# Patient Record
Sex: MALE | Race: WHITE | Employment: UNEMPLOYED | URBAN - METROPOLITAN AREA
[De-identification: names, ages, dates, MRNs, and addresses within clinical notes are randomized per-mention and may not be internally consistent; named-entity substitution may affect disease eponyms.]

---
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
## 2022-01-15 NOTE — PROGRESS NOTES
NAVIGN[facility] Clinician Contact & Progress Note  For Individual Resiliency Training (IRT)  A Part of the [facility] First Episode of Psychosis Program    NAVIGATE Enrollee: Bob Das (2000)     MRN: [id number]  Date:  12/24/21  Diagnosis: Psychosis, unspecified F29.0  Clinician: ADRI Individual Resiliency Trainer, MADELYN Scales     1. Type of contact: (majority of time spent)  IRT Session via telehealth  Mode of communication: Zoom (HIPAA compliant, secure platform). Patient consented verbally to this mode of therapy today.   Reason for telehealth: COVID-19. This patient visit was converted to a telehealth visit to minimize exposure to COVID-19.    2. People present:   Writer  Client: Yes    3. Length of Actual Contact: Start Time: 9:am; End Time: 10:02am     4. Location of contact:  Originating Location (patient location): [facility], located in [place], [place] (writer obtained [place] license #[id number])  Distant Location (provider location): Home office, located in [place], [place], using appropriate privacy considerations and procedures    5. Did the client complete the home practice option(s) from the previous session: partially completed    6. Motivational Teaching Strategies:  Connect info and skills with personal goals  Promote hope and positive expectations  Explore pros and cons of change  Re-frame experiences in positive light    7. Educational Teaching Strategies:  Review of written material/education  Relate information to client's experience  Ask questions to check comprehension  Break down information into small chunks  Adopt client's language     8. CBT Teaching Strategies:  Reinforcement and shaping (positive feedback for steps towards goals and gains in knowledge & skills)  Relapse prevention planning (review of stressors and early warning signs)  Coping skills training (review current coping skills and increase currently used skills)  Behavioral tailoring (fit taking medication into client's daily  routine)    9. IRT Module(s) Addressed:  Coping with symptoms  Building a bridge to goals    10. Techniques utilized:   [place] announced at beginning of session  Review of goal  Review of previous meeting  Present new material  Problem-solving practice  Help client choose a home practice option  Summarize progress made in current session    11. Measures:    Mental Status Exam  Alertness: alert  and oriented  Behavior/Demeanor: cooperative and pleasant  Speech: regular rate and rhythm  Language: intact and no obvious problem.   Mood: depressed and description consistent with euthymia   Thought Process/Associations: unremarkable  Thought Content:  Reports preoccupations;  Denies suicidal ideation, violent ideation and delusions  Perception:  Reports auditory hallucinations without commands [details in Interim History], visual hallucinations and tactile hallucinations (no significant changes);  Denies depersonalization and derealization  Insight: fair  Judgment: fair  Cognition: does  appear grossly intact; formal cognitive testing was not done    12. Assessment/Progress Note:     Writer met with Bob via telehealth on this date for IRT. Writer set agenda to check-in, discuss and assess symptoms, explore material in IRT modules, discuss ways in which Bob can expand coping strategies and stress-management techniques for ongoing symptom management, and check-in regarding goals for ongoing recovery. During check-in, assessed mood and symptoms. Bob reports continued depressed mood and reports no changes to symptoms of psychosis. Denies any safety concerns today. Spent time in session reviewing the symptoms of depression and engaged Bob in reflection of if/how depression may be impacting his life day-to-day. Writer offered support and provided psychoeducation on components of behavioral activation as a way to cope with and potentially improve depression. Bob identifies a goal of making friends and increasing community.  "When writer engages Bob in exploration of potential ways to do this, he consistently offers a response to the effect of, \"what good would come of it?\" and it seems feels any efforts will not be helpful or get him anywhere closer to what he is looking for. Writer challenged Bob to shift response from what good will come of it to \"why not? What harm would it do trying something new?\" Bob was partially open and receptive to this it seemed. Spent time in session then discussing dynamics present in the therapeutic relationship including feeling that he is just a number, wondering if writer actually cares and feeling \"pathetic\" divulging such personal information to someone who meets with so many other people. Writer validated Bob's perspective. Expressed understanding why he feels this way; writer communicated care for Bob and invited him to continue to share as he feels comfortable. Will continue to attend to this dynamic and these concerns. Bob then shared a goal of moving abroad and reflected on the concept that the comfort zone is where dreams go to die. Writer provided validation, support and re-framing where appropriate. Utilized primarily an insight-oriented, strengths-based approach to promote and enhance self-awareness, understanding and acceptance.     Overall, Bob was open and engaged throughout the session. Symptom assessment, safety assessment, discussion and identification of coping strategies, and exploration of material in IRT modules was all in support of Bob's self-identified goal(s) as identified in most recent BEH Treatment Plan. Progress toward goal completion seems fair.    13. Plan/Referrals:     Next session scheduled 1/6 at 9am. Client aware they can reach out to writer directly and/or [facility] team should concerns or needs arise prior to next scheduled appointment.     Billing for \"Interactive Complexity\"?    No      MADELYN Scales    NAVIGATE Individual Resiliency Trainer  "

## 2022-01-21 ENCOUNTER — VIRTUAL VISIT (OUTPATIENT)
Dept: PSYCHIATRY | Facility: CLINIC | Age: 22
End: 2022-01-21
Payer: MEDICAID

## 2022-01-21 ENCOUNTER — BEH TREATMENT PLAN (OUTPATIENT)
Dept: PSYCHIATRY | Facility: CLINIC | Age: 22
End: 2022-01-21

## 2022-01-21 DIAGNOSIS — F29 PSYCHOSIS, UNSPECIFIED PSYCHOSIS TYPE (H): Primary | ICD-10-CM

## 2022-01-21 NOTE — PROGRESS NOTES
NAVIGN[facility] Clinician Contact & Progress Note  For Individual Resiliency Training (IRT)  A Part of the [facility] First Episode of Psychosis Program    NAVIGATE Enrollee: Bob Das (2000)     MRN: [id number]  Date:  1/21/22  Diagnosis: Psychosis, unspecified F29.0  Clinician: [facility] Individual Resiliency Trainer, MADELYN Scales     1. Type of contact: (majority of time spent)  IRT Session via telehealth  Mode of communication: Zoom (HIPAA compliant, secure platform). Patient consented verbally to this mode of therapy today.   Reason for telehealth: COVID-19. This patient visit was converted to a telehealth visit to minimize exposure to COVID-19.    2. People present:   Writer  Client: Yes    3. Length of Actual Contact: Start Time: 9:10am; End Time: 10:11am     4. Location of contact:  Originating Location (patient location): [place], located in [place], [place] (writer obtained [place] license #[id number])  Distant Location (provider location): Home office, located in [place], [place], using appropriate privacy considerations and procedures    5. Did the client complete the home practice option(s) from the previous session: partially completed    6. Motivational Teaching Strategies:  Connect info and skills with personal goals  Promote hope and positive expectations  Explore pros and cons of change  Re-frame experiences in positive light    7. Educational Teaching Strategies:  Review of written material/education  Relate information to client's experience  Ask questions to check comprehension  Break down information into small chunks  Adopt client's language     8. CBT Teaching Strategies:  Reinforcement and shaping (positive feedback for steps towards goals and gains in knowledge & skills)  Relapse prevention planning (review of stressors and early warning signs)  Coping skills training (review current coping skills and increase currently used skills)  Behavioral tailoring (fit taking medication into client's daily  routine)    9. IRT Module(s) Addressed:  Coping with symptoms  Building a bridge to goals    10. Techniques utilized:   [place] announced at beginning of session  Review of goal  Review of previous meeting  Present new material  Problem-solving practice  Help client choose a home practice option  Summarize progress made in current session    11. Measures:    Mental Status Exam  Alertness: alert  and oriented  Behavior/Demeanor: cooperative and pleasant  Speech: regular rate and rhythm  Language: intact and no obvious problem.   Mood: depressed and description consistent with euthymia   Thought Process/Associations: unremarkable  Thought Content:  Reports preoccupations;  Denies suicidal ideation, violent ideation and delusions  Perception:  Reports auditory hallucinations without commands [details in Interim History], visual hallucinations and tactile hallucinations (no significant changes);  Denies depersonalization and derealization  Insight: fair  Judgment: fair  Cognition: does  appear grossly intact; formal cognitive testing was not done    KATHY-7  Over the last 2 weeks, how often have you been bothered by the following problems?    1. Feeling nervous, anxious or on edge: 2 - More than half the days  2. Not being able to stop or control worryin - Several days  3. Worrying too much about different things: 1 - Several days  4. Trouble relaxin - Several days  5. Being so restless that it is hard to sit still: 1 - Several days  6. Becoming easily annoyed or irritable: 1 - Several days  7. Feeling afraid as if something awful might happen: 1 - Several days    PHQ-9  Over the last 2 weeks, how often have you been bothered by any the following problems?    1. Little interest or pleasure in doing things: 1 - Several days  2. Feeling down, depressed, or hopeless: 1 - Several days; 1.5  3. Trouble falling or staying asleep, or sleeping too much: 2 - More than half the days; varying sleep schedule, sometimes  "fragmented sleep, hard to evaluate  4. Feeling tired or having little energy: 2 - More than half the days  5. Poor appetite or overeatin - Not at all  6. Feeling bad about yourself - or that you are a failure or have let yourself or your family down: 1 - Several days  7. Trouble concentrating on things, such as reading the newspaper or watching television: 0 - Not at all  8. Moving or speaking so slowly that other people could have noticed. Or the opposite-being fidgety or restless that you have been moving around a lot more than usual: 1 - Several days  9. Thoughts that you would be better off dead, or of hurting yourself in some way: 1 - Several days; \"one at most\"    If you checked off any problems, how difficulty have these problems made it for you to do your work, take care of things at home, or get along with other people? Somewhat difficult    [place] Protocol Risk Identification  1) Have you wished you were dead or wished you could go to sleep and not wake up? Yes; \"at one point briefly\"  2) Have you actually had any thoughts about killing yourself? No  If YES to 2, answer questions 3, 4, 5, 6  If NO to 2, go directly to question 6  3) Have you thought about how you might do this? No  4) Have you had any intension of acting on these thoughts of killing yourself, as opposed to you have the thoughts but you definitely would not act on them? No  5) Have you started to work out or worked out the details of how to kill yourself? Do you intend to carry out this plan? No  Always Ask Question 6  6) Have you done anything, started to do anything, or prepared to do anything to end your life? No  Examples: collected pills, obtained a gun, gave away valuables, wrote a will or suicide note, held a gun but changed your mind, cut yourself, tried to hang yourself, etc.      12. Assessment/Progress Note:     Writer met with Bob via telehealth on this date for IRT. Writer set agenda to check-in, discuss and assess " "symptoms, explore material in IRT modules, discuss ways in which Bob can expand coping strategies and stress-management techniques for ongoing symptom management, and check-in regarding goals for ongoing recovery. During check-in, assessed mood and symptoms. Bob reports overall he has been doing well. Reports no changes to psychosis and describes experience of symptoms to be manageable. Reports one time a \"fleeting thought\" related to suicide but other than that denies SI/SH and denies HI/VI. Spent time in session reflecting back on this past year and engaging Bob in exploration of what he feels went well and what he may hope could be different for this next year. Bob was very active and engaged in this exercise. 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 Will discuss details of goals next week due to time constraints. Throughout the session writer provided validation, support and re-framing where appropriate. Utilized primarily an insight-oriented, strengths-based approach to promote and enhance self-awareness, understanding and acceptance. Overall, Bob was open and engaged throughout the session. Symptom assessment, safety assessment, discussion and identification of coping strategies, and exploration of material in IRT modules was all in support of Bob's self-identified goal(s) as identified in most recent BEH Treatment Plan. Progress toward goal completion seems fair.    13. Plan/Referrals:     Next session scheduled in one week. Client aware they can reach out to writer directly and/or [facility]IGATE team should concerns or needs arise prior to next scheduled appointment.     Billing for \"Interactive Complexity\"?    No      Nancy Rubin [facility]    NAVIGATE Individual Resiliency Trainer  "

## 2022-02-04 ENCOUNTER — VIRTUAL VISIT (OUTPATIENT)
Dept: PSYCHIATRY | Facility: CLINIC | Age: 22
End: 2022-02-04
Payer: MEDICAID

## 2022-02-04 DIAGNOSIS — F29 PSYCHOSIS, UNSPECIFIED PSYCHOSIS TYPE (H): Primary | ICD-10-CM

## 2022-02-04 NOTE — PROGRESS NOTES
NA[facility] Clinician Contact & Progress Note  For Individual Resiliency Training (IRT)  A Part of the [facility] First Episode of Psychosis Program    NAVIGATE Enrollee: Bob Das (2000)     MRN: [id number]  Date:  2/04/22  Diagnosis: Psychosis, unspecified F29.0  Clinician: [facility] Individual Resiliency Trainer, MADELYN Scales     1. Type of contact: (majority of time spent)  IRT Session via telehealth  Mode of communication: [email or web address] (HIPAA compliant, secure platform). Patient consented verbally to this mode of therapy today.   Reason for telehealth: COVID-19. This patient visit was converted to a telehealth visit to minimize exposure to COVID-19.    2. People present:   Writer  Client: Yes    3. Length of Actual Contact: Start Time: 9:am; End Time: 9:57am     4. Location of contact:  Originating Location (patient location): [place], located in [place], [place] (writer obtained [place] license #[id number])  Distant Location (provider location): Home office, located in [place], [place], using appropriate privacy considerations and procedures    5. Did the client complete the home practice option(s) from the previous session: partially completed    6. Motivational Teaching Strategies:  Connect info and skills with personal goals  Promote hope and positive expectations  Explore pros and cons of change  Re-frame experiences in positive light    7. Educational Teaching Strategies:  Review of written material/education  Relate information to client's experience  Ask questions to check comprehension  Break down information into small chunks  Adopt client's language     8. CBT Teaching Strategies:  Reinforcement and shaping (positive feedback for steps towards goals and gains in knowledge & skills)  Relapse prevention planning (review of stressors and early warning signs)  Coping skills training (review current coping skills and increase currently used skills)  Behavioral tailoring (fit taking medication into client's daily  "routine)    9. IRT Module(s) Addressed:  Building a bridge to goals    10. Techniques utilized:   [place] announced at beginning of session  Review of goal  Review of previous meeting  Present new material  Problem-solving practice  Help client choose a home practice option  Summarize progress made in current session    11. Measures:    Mental Status Exam  Alertness: alert  and oriented  Behavior/Demeanor: cooperative and pleasant  Speech: regular rate and rhythm  Language: intact and no obvious problem.   Mood: depressed and description consistent with euthymia   Thought Process/Associations: unremarkable  Thought Content:  Reports preoccupations;  Denies suicidal ideation, violent ideation and delusions  Perception:  Reports auditory hallucinations without commands [details in Interim History], visual hallucinations and tactile hallucinations (no significant changes);  Denies depersonalization and derealization  Insight: fair  Judgment: fair  Cognition: does  appear grossly intact; formal cognitive testing was not done    12. Assessment/Progress Note:     Writer met with Bob via telehealth on this date for IRT. Writer set agenda to check-in, discuss and assess symptoms, explore material in IRT modules, discuss ways in which Bob can expand coping strategies and stress-management techniques for ongoing symptom management, and check-in regarding goals for ongoing recovery. During check-in, assessed mood and symptoms. Bob reports his mood as slightly improved recently; denies SI/SH, denies HI/VI and reports no changes to psychosis. 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 Spent time in session continuing to explore Bob's expressed goal of starting a web comic related to mental health or psychosis. Bob shared more about this goal, including his own experiences appreciating web comics by other individuals, and he discussed aspects of how this could " "look and what a storyline could entail. Bob shared different considerations about the best way to share this work and writer provided validation and support throughout. Utilized primarily an insight-oriented, strengths-based approach to promote and enhance self-awareness, understanding and acceptance. Set goal for next week to finish one drawing. Will continue to discuss and plan in upcoming sessions. Overall, Bob was open and engaged throughout the session. Symptom assessment, safety assessment, discussion and identification of coping strategies, and exploration of material in IRT modules was all in support of Bob's self-identified goal(s) as identified in most recent BEH Treatment Plan. Progress toward goal completion seems fair.    13. Plan/Referrals:     Next session scheduled in one week. Client aware they can reach out to writer directly and/or [facility]IG[facility] team should concerns or needs arise prior to next scheduled appointment.     Billing for \"Interactive Complexity\"?    No      MADELYN Scales    NAVIGATE Individual Resiliency Trainer  "

## 2022-02-18 ENCOUNTER — VIRTUAL VISIT (OUTPATIENT)
Dept: PSYCHIATRY | Facility: CLINIC | Age: 22
End: 2022-02-18
Payer: MEDICAID

## 2022-02-18 DIAGNOSIS — F29 PSYCHOSIS, UNSPECIFIED PSYCHOSIS TYPE (H): Primary | ICD-10-CM

## 2022-02-18 NOTE — PROGRESS NOTES
[facility] Clinician Contact & Progress Note  For Individual Resiliency Training (IRT)  A Part of the [facility] First Episode of Psychosis Program    NAVIGATE Enrollee: Bob Das (2000)     MRN: [id number]  Date:  2/18/22  Diagnosis: Psychosis, unspecified F29.0  Clinician: [facility] Individual Resiliency Trainer, MADELYN Scales     1. Type of contact: (majority of time spent)  IRT Session via telehealth  Mode of communication: [email or web address] (HIPAA compliant, secure platform). Patient consented verbally to this mode of therapy today.   Reason for telehealth: COVID-19. This patient visit was converted to a telehealth visit to minimize exposure to COVID-19.    2. People present:   Writer  Client: Yes    3. Length of Actual Contact: Start Time: 9:00am; End Time: 10:01am     4. Location of contact:  Originating Location (patient location): [facility], located in [place], [place] (writer obtained [place] license #[id number])  Distant Location (provider location): Home office, located in [place], [place], using appropriate privacy considerations and procedures    5. Did the client complete the home practice option(s) from the previous session: partially completed    6. Motivational Teaching Strategies:  Connect info and skills with personal goals  Promote hope and positive expectations  Explore pros and cons of change  Re-frame experiences in positive light    7. Educational Teaching Strategies:  Review of written material/education  Relate information to client's experience  Ask questions to check comprehension  Break down information into small chunks  Adopt client's language     8. CBT Teaching Strategies:  Reinforcement and shaping (positive feedback for steps towards goals and gains in knowledge & skills)  Relapse prevention planning (review of stressors and early warning signs)  Coping skills training (review current coping skills and increase currently used skills)  Behavioral tailoring (fit taking medication into client's daily  "routine)    9. IRT Module(s) Addressed:  Building a bridge to goals  Coping with Symptoms    10. Techniques utilized:   [place] announced at beginning of session  Review of goal  Review of previous meeting  Present new material  Problem-solving practice  Help client choose a home practice option  Summarize progress made in current session    11. Measures:    Mental Status Exam  Alertness: alert  and oriented  Behavior/Demeanor: cooperative and pleasant  Speech: regular rate and rhythm  Language: intact and no obvious problem.   Mood: depressed and description consistent with euthymia   Thought Process/Associations: unremarkable  Thought Content:  Reports preoccupations and obsessions ;  Denies suicidal ideation, violent ideation and delusions  Perception:  Reports auditory hallucinations without commands [details in Interim History], visual hallucinations and tactile hallucinations (no significant changes);  Denies depersonalization and derealization  Insight: fair  Judgment: fair  Cognition: does  appear grossly intact; formal cognitive testing was not done    12. Assessment/Progress Note:     Writer met with Bob via telehealth on this date for IRT. Writer set agenda to check-in, discuss and assess symptoms, explore material in IRT modules, discuss ways in which Bob can expand coping strategies and stress-management techniques for ongoing symptom management, and check-in regarding goals for ongoing recovery. During check-in, assessed mood and symptoms. Bob reports his mood has overall been good. Denies SI/SH and denies HI/VI. 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 Bob described this week \"not having it in me\" to be in contact with one of his good friends. Through exploration identified it was not necessarily the act of connecting that felt burdensome, but rather the way in " "which Bob feels he needs/wants to communicate and the amount of time, intention, thought and care he puts into every message. Writer suggested this isn't in and of itself negative in any way, but questioned Bob if there are ways that his perfectionism/particularity with his messages may interfere with his life or may be unhelpful in relationships and building the life he desires. Have queried Bob about this quality as it relates to his art and work as well. Bob was open and receptive to exploring this together. He agreed there may be some benefit in increasing his tolerance to continue interacting/engaging without need for perfection. Will plan to further explore what comes up for Bob when he considers engaging in a different way and not requiring so much of himself in every interaction. Will then utilize CBT and ACT framework to increase adaptive thoughts and increase psychological flexibility. Overall, Bob was open and engaged throughout the session. Symptom assessment, safety assessment, discussion and identification of coping strategies, and exploration of material in IRT modules was all in support of Bob's self-identified goal(s) as identified in most recent BEH Treatment Plan. Progress toward goal completion seems fair.    13. Plan/Referrals:     Next session scheduled in one week. Reminded Bob to complete Community Care Application that writer sent to [facility] a couple of weeks back.     Client aware they can reach out to writer directly and/or NAVIGATE team should concerns or needs arise prior to next scheduled appointment.     Billing for \"Interactive Complexity\"?    No      MADELYN Scales    NAVIGATE Individual Resiliency Trainer  "

## 2022-03-04 ENCOUNTER — VIRTUAL VISIT (OUTPATIENT)
Dept: PSYCHIATRY | Facility: CLINIC | Age: 22
End: 2022-03-04
Payer: MEDICAID

## 2022-03-04 DIAGNOSIS — F29 PSYCHOSIS, UNSPECIFIED PSYCHOSIS TYPE (H): Primary | ICD-10-CM

## 2022-03-11 ENCOUNTER — VIRTUAL VISIT (OUTPATIENT)
Dept: PSYCHIATRY | Facility: CLINIC | Age: 22
End: 2022-03-11
Payer: MEDICAID

## 2022-03-11 DIAGNOSIS — F29 PSYCHOSIS, UNSPECIFIED PSYCHOSIS TYPE (H): Primary | ICD-10-CM

## 2022-03-11 NOTE — PROGRESS NOTES
NAVIGNICO Clinician Contact & Progress Note  For Individual Resiliency Training (IRT)  A Part of the [facility] First Episode of Psychosis Program    NAVIGATE Enrollee: Bob Das (2000)     MRN: [id number]  Date:  3/11/22  Diagnosis: Psychosis, unspecified F29.0  Clinician: ADRI Individual Resiliency Trainer, MADELYN Scales     1. Type of contact: (majority of time spent)  IRT Session via telehealth  Mode of communication: [email or web address] (HIPAA compliant, secure platform). Patient consented verbally to this mode of therapy today.   Reason for telehealth: COVID-19. This patient visit was converted to a telehealth visit to minimize exposure to COVID-19.    2. People present:   Writer  Client: Yes    3. Length of Actual Contact: Start Time: 9:00am; End Time: 9:40am     4. Location of contact:  Originating Location (patient location): [facility], located in [place], [place] (writer obtained [place] license #[id number])  Distant Location (provider location): Home office, located in [place], [place], using appropriate privacy considerations and procedures    5. Did the client complete the home practice option(s) from the previous session: Completed    6. Motivational Teaching Strategies:  Connect info and skills with personal goals  Promote hope and positive expectations  Explore pros and cons of change  Re-frame experiences in positive light    7. Educational Teaching Strategies:  Review of written material/education  Relate information to client's experience  Ask questions to check comprehension  Break down information into small chunks  Adopt client's language     8. CBT Teaching Strategies:  Reinforcement and shaping (positive feedback for steps towards goals and gains in knowledge & skills)  Relapse prevention planning (review of stressors and early warning signs)  Coping skills training (review current coping skills and increase currently used skills)  Behavioral tailoring (fit taking medication into client's daily  routine)    9. IRT Module(s) Addressed:  Building a bridge to goals  Coping with Symptoms    10. Techniques utilized:   [place] announced at beginning of session  Review of goal  Review of previous meeting  Present new material  Problem-solving practice  Help client choose a home practice option  Summarize progress made in current session    11. Measures:    Mental Status Exam  Alertness: alert  and oriented  Behavior/Demeanor: cooperative and pleasant  Speech: regular rate and rhythm  Language: intact and no obvious problem.   Mood: depressed and description consistent with euthymia   Thought Process/Associations: unremarkable  Thought Content:  Reports preoccupations and obsessions ;  Denies suicidal ideation, violent ideation and delusions  Perception:  Reports auditory hallucinations without commands [details in Interim History], visual hallucinations and tactile hallucinations (no significant changes);  Denies depersonalization and derealization  Insight: fair  Judgment: fair  Cognition: does  appear grossly intact; formal cognitive testing was not done    KATHY-7  Over the last 2 weeks, how often have you been bothered by the following problems?    1. Feeling nervous, anxious or on edge: 1 - Several days  2. Not being able to stop or control worryin - Not at all  3. Worrying too much about different things: 1 - Several days  4. Trouble relaxin - Not at all  5. Being so restless that it is hard to sit still: 0 - Not at all  6. Becoming easily annoyed or irritable: 0 - Not at all  7. Feeling afraid as if something awful might happen: 0 - Not at all    PHQ-9  Over the last 2 weeks, how often have you been bothered by any the following problems?    1. Little interest or pleasure in doing things: 0 - Not at all  2. Feeling down, depressed, or hopeless: 0 - Not at all  3. Trouble falling or staying asleep, or sleeping too much: 2 - More than half the days; some days where I oversleep  4. Feeling tired or having  little energy: 1 - Several days  5. Poor appetite or overeatin - Not at all  6. Feeling bad about yourself - or that you are a failure or have let yourself or your family down: 1 - Several days  7. Trouble concentrating on things, such as reading the newspaper or watching television: 0 - Not at all  8. Moving or speaking so slowly that other people could have noticed. Or the opposite-being fidgety or restless that you have been moving around a lot more than usual: 0 - Not at all  9. Thoughts that you would be better off dead, or of hurting yourself in some way: 0 - Not at all    If you checked off any problems, how difficulty have these problems made it for you to do your work, take care of things at home, or get along with other people? Not difficult at all    [place] Protocol Risk Identification  1) Have you wished you were dead or wished you could go to sleep and not wake up? No  2) Have you actually had any thoughts about killing yourself? No  If YES to 2, answer questions 3, 4, 5, 6  If NO to 2, go directly to question 6  3) Have you thought about how you might do this? N/A  4) Have you had any intension of acting on these thoughts of killing yourself, as opposed to you have the thoughts but you definitely would not act on them? N/A  5) Have you started to work out or worked out the details of how to kill yourself? Do you intend to carry out this plan? N/A  Always Ask Question 6  6) Have you done anything, started to do anything, or prepared to do anything to end your life? No  Examples: collected pills, obtained a gun, gave away valuables, wrote a will or suicide note, held a gun but changed your mind, cut yourself, tried to hang yourself, etc.      12. Assessment/Progress Note:     Writer met with Bob via telehealth on this date for IRT. Writer set agenda to check-in, discuss and assess symptoms, explore material in IRT modules, discuss ways in which Bob can expand coping strategies and  "stress-management techniques for ongoing symptom management, and check-in regarding goals for ongoing recovery. During check-in, completed formal assessment measures as documented above. Bob reports overall improvement in both mood and anxiety. Describes the past week as positive and shared different ways Bob has been enjoying his time. Spent time in session offering space for Bob to process current events including war in [place]. Reflected together on what is in our control versus what isn't and how to do what we can without getting overwhelmed. Also discussed plans for his birthday coming up. .Writer provided validation, support and re-framing where appropriate. Utilized primarily an insight-oriented, strengths-based approach to promote and enhance self-awareness, understanding and acceptance. Overall, Bob was open and engaged throughout the session. Symptom assessment, safety assessment, discussion and identification of coping strategies, and exploration of material in IRT modules was all in support of Bob's self-identified goal(s) as identified in most recent BEH Treatment Plan. Progress toward goal completion seems fair.    13. Plan/Referrals:     Next session scheduled in one week.    Client aware they can reach out to writer directly and/or NAVIGATE team should concerns or needs arise prior to next scheduled appointment.     Billing for \"Interactive Complexity\"?    No      Nancy Rubin [facility]    NAVIGATE Individual Resiliency Trainer  "

## 2022-03-25 ENCOUNTER — VIRTUAL VISIT (OUTPATIENT)
Dept: PSYCHIATRY | Facility: CLINIC | Age: 22
End: 2022-03-25
Payer: MEDICAID

## 2022-03-25 DIAGNOSIS — F29 PSYCHOSIS, UNSPECIFIED PSYCHOSIS TYPE (H): Primary | ICD-10-CM

## 2022-03-25 NOTE — PROGRESS NOTES
NAVIGNICO Clinician Contact & Progress Note  For Individual Resiliency Training (IRT)  A Part of the [facility] First Episode of Psychosis Program    NAVIGATE Enrollee: Bob Das (2000)     MRN: [id number]  Date:  3/25/22  Diagnosis: Psychosis, unspecified F29.0  Clinician: ADRI Individual Resiliency Trainer, MADELYN Scales     1. Type of contact: (majority of time spent)  IRT Session via telehealth  Mode of communication: [email or web address] (HIPAA compliant, secure platform). Patient consented verbally to this mode of therapy today.   Reason for telehealth: COVID-19. This patient visit was converted to a telehealth visit to minimize exposure to COVID-19.    2. People present:   Writer  Client: Yes    3. Length of Actual Contact: Start Time: 9:00am; End Time: 9:58am     4. Location of contact:  Originating Location (patient location): [place], located in [place], [place] (writer obtained [place] license #[id number])  Distant Location (provider location): Home office, located in [place], [place], using appropriate privacy considerations and procedures    5. Did the client complete the home practice option(s) from the previous session: Completed    6. Motivational Teaching Strategies:  Connect info and skills with personal goals  Promote hope and positive expectations  Explore pros and cons of change  Re-frame experiences in positive light    7. Educational Teaching Strategies:  Review of written material/education  Relate information to client's experience  Ask questions to check comprehension  Break down information into small chunks  Adopt client's language     8. CBT Teaching Strategies:  Reinforcement and shaping (positive feedback for steps towards goals and gains in knowledge & skills)  Relapse prevention planning (review of stressors and early warning signs)  Coping skills training (review current coping skills and increase currently used skills)  Behavioral tailoring (fit taking medication into client's daily  routine)    9. IRT Module(s) Addressed:  Building a bridge to goals  Resiliency    10. Techniques utilized:   [place] announced at beginning of session  Review of goal  Review of previous meeting  Present new material  Problem-solving practice  Help client choose a home practice option  Summarize progress made in current session    11. Measures:    Mental Status Exam  Alertness: alert  and oriented  Behavior/Demeanor: cooperative and pleasant  Speech: regular rate and rhythm  Language: intact and no obvious problem.   Mood: depressed and description consistent with euthymia   Thought Process/Associations: unremarkable  Thought Content:  Reports preoccupations and obsessions ;  Denies suicidal ideation, violent ideation and delusions  Perception:  Reports auditory hallucinations without commands [details in Interim History], visual hallucinations and tactile hallucinations (no significant changes);  Denies depersonalization and derealization  Insight: fair  Judgment: fair  Cognition: does  appear grossly intact; formal cognitive testing was not done    KATHY-7  Over the last 2 weeks, how often have you been bothered by the following problems?    1. Feeling nervous, anxious or on edge: 1 - Several days  2. Not being able to stop or control worryin - Several days  3. Worrying too much about different things: 2 - More than half the days; worries related to drawings  4. Trouble relaxin - Several days  5. Being so restless that it is hard to sit still: 2 - More than half the days  6. Becoming easily annoyed or irritable: 0 - Not at all  7. Feeling afraid as if something awful might happen: 0 - Not at all    PHQ-9  Over the last 2 weeks, how often have you been bothered by any the following problems?    1. Little interest or pleasure in doing things: 1 - Several days  2. Feeling down, depressed, or hopeless: 0 - Not at all  3. Trouble falling or staying asleep, or sleeping too much: 1 - Several days; sleeping too  much  4. Feeling tired or having little energy: 2 - More than half the days  5. Poor appetite or overeatin - Not at all  6. Feeling bad about yourself - or that you are a failure or have let yourself or your family down: 0 - Not at all  7. Trouble concentrating on things, such as reading the newspaper or watching television: 0 - Not at all  8. Moving or speaking so slowly that other people could have noticed. Or the opposite-being fidgety or restless that you have been moving around a lot more than usual: 1 - Several days  9. Thoughts that you would be better off dead, or of hurting yourself in some way: 0 - Not at all    If you checked off any problems, how difficulty have these problems made it for you to do your work, take care of things at home, or get along with other people? Somewhat difficult    [place] Protocol Risk Identification  1) Have you wished you were dead or wished you could go to sleep and not wake up? No  2) Have you actually had any thoughts about killing yourself? No  If YES to 2, answer questions 3, 4, 5, 6  If NO to 2, go directly to question 6  3) Have you thought about how you might do this? N/A  4) Have you had any intension of acting on these thoughts of killing yourself, as opposed to you have the thoughts but you definitely would not act on them? N/A  5) Have you started to work out or worked out the details of how to kill yourself? Do you intend to carry out this plan? N/A  Always Ask Question 6  6) Have you done anything, started to do anything, or prepared to do anything to end your life? No  Examples: collected pills, obtained a gun, gave away valuables, wrote a will or suicide note, held a gun but changed your mind, cut yourself, tried to hang yourself, etc.      12. Assessment/Progress Note:     Writer met with Bob via telehealth on this date for IRT. Writer set agenda to check-in, discuss and assess symptoms, explore material in IRT modules, discuss ways in which Bob can  "expand coping strategies and stress-management techniques for ongoing symptom management, and check-in regarding goals for ongoing recovery. During check-in, completed formal assessment measures as documented above. Bob denies SI/SH and denies HI/VI. Reports no changes to psychosis symptoms and feels he is managing well. Spent time in session offering space for Bob to reflect on his birthday and not being a teenager anymore. Spent time exploring his interests and hopes for the future and for this next phase of his life. Bob was active and engaged in this exercise. Identified a number of things that he is looking forward to about the future and also shared an interest in marine biology. Writer provided validation, support and re-framing where appropriate. Utilized primarily an insight-oriented, strengths-based approach to promote and enhance self-awareness, understanding and acceptance.  Overall, Bob was open and engaged throughout the session. Symptom assessment, safety assessment, discussion and identification of coping strategies, and exploration of material in IRT modules was all in support of Bob's self-identified goal(s) as identified in most recent BEH Treatment Plan. Progress toward goal completion seems fair.    13. Plan/Referrals:     Next session scheduled in one week.     Client aware they can reach out to writer directly and/or [facility]IG[facility] team should concerns or needs arise prior to next scheduled appointment.     Billing for \"Interactive Complexity\"?    No      MADELYN Scales    NAVIGATE Individual Resiliency Trainer  "

## 2022-04-01 ENCOUNTER — VIRTUAL VISIT (OUTPATIENT)
Dept: PSYCHIATRY | Facility: CLINIC | Age: 22
End: 2022-04-01
Payer: MEDICAID

## 2022-04-01 DIAGNOSIS — F29 PSYCHOSIS, UNSPECIFIED PSYCHOSIS TYPE (H): Primary | ICD-10-CM

## 2022-04-08 ENCOUNTER — BEH TREATMENT PLAN (OUTPATIENT)
Dept: PSYCHIATRY | Facility: CLINIC | Age: 22
End: 2022-04-08

## 2022-04-08 ENCOUNTER — VIRTUAL VISIT (OUTPATIENT)
Dept: PSYCHIATRY | Facility: CLINIC | Age: 22
End: 2022-04-08
Payer: MEDICAID

## 2022-04-08 DIAGNOSIS — F29 PSYCHOSIS, UNSPECIFIED PSYCHOSIS TYPE (H): Primary | ICD-10-CM

## 2022-04-08 NOTE — PROGRESS NOTES
NAVIGNICO Clinician Contact & Progress Note  For Individual Resiliency Training (IRT)  A Part of the [facility] First Episode of Psychosis Program    NAVIGATE Enrollee: Bob Das (2000)     MRN: [id number]  Date:  3/04/22  Diagnosis: Psychosis, unspecified F29.0  Clinician: ADRI Individual Resiliency Trainer, MADELYN Scales     1. Type of contact: (majority of time spent)  IRT Session via telehealth  Mode of communication: [email or web address] (HIPAA compliant, secure platform). Patient consented verbally to this mode of therapy today.   Reason for telehealth: COVID-19. This patient visit was converted to a telehealth visit to minimize exposure to COVID-19.    2. People present:   Writer  Client: Yes    3. Length of Actual Contact: Start Time: 9:20am; End Time: 10:01am     4. Location of contact:  Originating Location (patient location): [place], located in [place], [place] (writer obtained [place] license #[id number])  Distant Location (provider location): Home office, located in [place], [place], using appropriate privacy considerations and procedures    5. Did the client complete the home practice option(s) from the previous session: Completed    6. Motivational Teaching Strategies:  Connect info and skills with personal goals  Promote hope and positive expectations  Explore pros and cons of change  Re-frame experiences in positive light    7. Educational Teaching Strategies:  Review of written material/education  Relate information to client's experience  Ask questions to check comprehension  Break down information into small chunks  Adopt client's language     8. CBT Teaching Strategies:  Reinforcement and shaping (positive feedback for steps towards goals and gains in knowledge & skills)  Relapse prevention planning (review of stressors and early warning signs)  Coping skills training (review current coping skills and increase currently used skills)  Behavioral tailoring (fit taking medication into client's daily  routine)    9. IRT Module(s) Addressed:  Building a bridge to goals  Coping with Symptoms    10. Techniques utilized:   [place] announced at beginning of session  Review of goal  Review of previous meeting  Present new material  Problem-solving practice  Help client choose a home practice option  Summarize progress made in current session    11. Measures:    Mental Status Exam  Alertness: alert  and oriented  Behavior/Demeanor: cooperative and pleasant  Speech: regular rate and rhythm  Language: intact and no obvious problem.   Mood: depressed and description consistent with euthymia   Thought Process/Associations: unremarkable  Thought Content:  Reports preoccupations and obsessions ;  Denies suicidal ideation, violent ideation and delusions  Perception:  Reports auditory hallucinations without commands [details in Interim History], visual hallucinations and tactile hallucinations (no significant changes);  Denies depersonalization and derealization  Insight: fair  Judgment: fair  Cognition: does  appear grossly intact; formal cognitive testing was not done    12. Assessment/Progress Note:     Writer met with Bob via telehealth on this date for IRT. Writer set agenda to check-in, discuss and assess symptoms, explore material in IRT modules, discuss ways in which Bob can expand coping strategies and stress-management techniques for ongoing symptom management, and check-in regarding goals for ongoing recovery. During check-in, assessed mood and symptoms. Bob reports his mood has overall been good. Denies SI/SH and denies HI/VI. 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 Explored together benefits of this quality and also ways in which it may get in the way of his goals. Will continue to work to expand the ways in which Bob is able to be present to and accept distressing private experiences - like internalized pressure he  "places on himself - with the goal of opening up committed action toward valued living. Overall, Bob was open and engaged throughout the session. Symptom assessment, safety assessment, discussion and identification of coping strategies, and exploration of material in IRT modules was all in support of Bob's self-identified goal(s) as identified in most recent BEH Treatment Plan. Progress toward goal completion seems fair.    13. Plan/Referrals:     Next session scheduled in one week.     Client aware they can reach out to writer directly and/or [facility]IG[facility] team should concerns or needs arise prior to next scheduled appointment.     Billing for \"Interactive Complexity\"?    No      MADELYN Scales    NAVIGATE Individual Resiliency Trainer  "

## 2022-04-08 NOTE — PROGRESS NOTES
[facility] Clinician Contact & Progress Note  For Individual Resiliency Training (IRT)  A Part of the [facility] First Episode of Psychosis Program    [employer] Enrollee: Bob Das (2000)     MRN: [id number]  Date:  4/08/22  Diagnosis: Psychosis, unspecified F29.0  Clinician: [facility] Individual Resiliency Trainer, MADELYN Scales     1. Type of contact: (majority of time spent)  IRT Session via telehealth  Mode of communication: [email or web address] (HIPAA compliant, secure platform). Patient consented verbally to this mode of therapy today.   Reason for telehealth: COVID-19. This patient visit was converted to a telehealth visit to minimize exposure to COVID-19.    2. People present:   Writer  Client: Yes    3. Length of Actual Contact: Start Time: 9:05am; End Time: 9:49am     4. Location of contact:  Originating Location (patient location): [place], located in [place], [place] (writer obtained [place] license #[id number])  Distant Location (provider location): Home office, located in [place], [place], using appropriate privacy considerations and procedures    5. Did the client complete the home practice option(s) from the previous session: Completed    6. Motivational Teaching Strategies:  Connect info and skills with personal goals  Promote hope and positive expectations  Explore pros and cons of change  Re-frame experiences in positive light    7. Educational Teaching Strategies:  Review of written material/education  Relate information to client's experience  Ask questions to check comprehension  Break down information into small chunks  Adopt client's language     8. CBT Teaching Strategies:  Reinforcement and shaping (positive feedback for steps towards goals and gains in knowledge & skills)  Relapse prevention planning (review of stressors and early warning signs)  Coping skills training (review current coping skills and increase currently used skills)  Behavioral tailoring (fit taking medication into client's daily  routine)    9. IRT Module(s) Addressed:  Resiliency  Graduation from [employer] Assignment  Communication    10. Techniques utilized:   [place] announced at beginning of session  Review of goal  Review of previous meeting  Present new material  Problem-solving practice  Help client choose a home practice option  Summarize progress made in current session    11. Measures:    Mental Status Exam  Alertness: alert  and oriented  Behavior/Demeanor: cooperative and pleasant  Speech: regular rate and rhythm  Language: intact and no obvious problem.   Mood: description consistent with euthymia   Thought Process/Associations: unremarkable  Thought Content:  Reports preoccupations and obsessions ;  Denies suicidal ideation, violent ideation and delusions  Perception:  Reports auditory hallucinations without commands [details in Interim History], visual hallucinations and tactile hallucinations (no significant changes);  Denies depersonalization and derealization  Insight: fair  Judgment: fair  Cognition: does  appear grossly intact; formal cognitive testing was not done    KATHY-7  Over the last 2 weeks, how often have you been bothered by the following problems?    1. Feeling nervous, anxious or on edge: 1 - Several days  2. Not being able to stop or control worryin - Not at all  3. Worrying too much about different things: 1 - Several days  4. Trouble relaxin - Several days  5. Being so restless that it is hard to sit still: 0 - Not at all  6. Becoming easily annoyed or irritable: 0 - Not at all  7. Feeling afraid as if something awful might happen: 1 - Several days    PHQ-9  Over the last 2 weeks, how often have you been bothered by any the following problems?    1. Little interest or pleasure in doing things: 1 - Several days  2. Feeling down, depressed, or hopeless: 1 - Several days  3. Trouble falling or staying asleep, or sleeping too much: 2 - More than half the days; sleeping too much  4. Feeling tired or having little  energy: 1 - Several days  5. Poor appetite or overeatin - Not at all  6. Feeling bad about yourself - or that you are a failure or have let yourself or your family down: 0 - Not at all  7. Trouble concentrating on things, such as reading the newspaper or watching television: 0 - Not at all  8. Moving or speaking so slowly that other people could have noticed. Or the opposite-being fidgety or restless that you have been moving around a lot more than usual: 1 - Several days; fidgety  9. Thoughts that you would be better off dead, or of hurting yourself in some way: 0 - Not at all    If you checked off any problems, how difficulty have these problems made it for you to do your work, take care of things at home, or get along with other people? Somewhat difficult    [place] Protocol Risk Identification  1) Have you wished you were dead or wished you could go to sleep and not wake up? No  2) Have you actually had any thoughts about killing yourself? No  If YES to 2, answer questions 3, 4, 5, 6  If NO to 2, go directly to question 6  3) Have you thought about how you might do this? N/A  4) Have you had any intension of acting on these thoughts of killing yourself, as opposed to you have the thoughts but you definitely would not act on them? N/A  5) Have you started to work out or worked out the details of how to kill yourself? Do you intend to carry out this plan? N/A  Always Ask Question 6  6) Have you done anything, started to do anything, or prepared to do anything to end your life? No  Examples: collected pills, obtained a gun, gave away valuables, wrote a will or suicide note, held a gun but changed your mind, cut yourself, tried to hang yourself, etc.      12. Assessment/Progress Note:     Writer met with Bob via telehealth on this date for IRT. Writer set agenda to check-in, discuss and assess symptoms, explore material in IRT modules, discuss ways in which Bob can expand coping strategies and  "stress-management techniques for ongoing symptom management, and check-in regarding goals for ongoing recovery. During check-in, completed formal assessment measures as documented above. Bob describes the past week as \"fine.\" Denies any safety concerns and denies any changes to psychosis. Spent time in session offering space for Bob to ask clarifying questions with regards to graduation from [employer] including if last visit could be in-person, which writer confirmed it can be. Continued to process and challenge unhelpful cognitions as it relates to Bob's process and integration of his hopes both for now and the future. Also spent time discussing relationship with Mom; Bob reflected on the ways interactions have been positive recently and expressed his appreciation for this. Discussed strategies for communicating effectively with Mom. Will continue next week. Overall, Bob was open and engaged throughout the session. Symptom assessment, safety assessment, discussion and identification of coping strategies, and exploration of material in IRT modules was all in support of Bob's self-identified goal(s) as identified in most recent BEH Treatment Plan. Progress toward goal completion seems fair.    13. Plan/Referrals:     Next session scheduled in one week.    Client aware they can reach out to writer directly and/or [employer] team should concerns or needs arise prior to next scheduled appointment.     Billing for \"Interactive Complexity\"?    No      MADELYN Scales Individual Resiliency Trainer  "

## 2022-04-08 NOTE — PROGRESS NOTES
[facility] Clinician Contact & Progress Note  For Individual Resiliency Training (IRT)  A Part of the [facility] First Episode of Psychosis Program    [facility] Enrollee: Bob Das (2000)     MRN: [id number]  Date:  4/01/22  Diagnosis: Psychosis, unspecified F29.0  Clinician: [facility] Individual Resiliency Trainer, MADELYN Scales     1. Type of contact: (majority of time spent)  IRT Session via telehealth  Mode of communication: [email or web address] (HIPAA compliant, secure platform). Patient consented verbally to this mode of therapy today.   Reason for telehealth: COVID-19. This patient visit was converted to a telehealth visit to minimize exposure to COVID-19.    2. People present:   Writer  Client: Yes    3. Length of Actual Contact: Start Time: 9:10am; End Time: 10:05am     4. Location of contact:  Originating Location (patient location): [place], located in [place], [place] (writer obtained [place] license #[id number])  Distant Location (provider location): Home office, located in [place], [place], using appropriate privacy considerations and procedures    5. Did the client complete the home practice option(s) from the previous session: Completed    6. Motivational Teaching Strategies:  Connect info and skills with personal goals  Promote hope and positive expectations  Explore pros and cons of change  Re-frame experiences in positive light    7. Educational Teaching Strategies:  Review of written material/education  Relate information to client's experience  Ask questions to check comprehension  Break down information into small chunks  Adopt client's language     8. CBT Teaching Strategies:  Reinforcement and shaping (positive feedback for steps towards goals and gains in knowledge & skills)  Relapse prevention planning (review of stressors and early warning signs)  Coping skills training (review current coping skills and increase currently used skills)  Behavioral tailoring (fit taking medication into client's daily  "routine)    9. IRT Module(s) Addressed:  Treatment Plan  Progress on goals  Graduation    10. Techniques utilized:   [place] announced at beginning of session  Review of goal  Review of previous meeting  Present new material  Problem-solving practice  Help client choose a home practice option  Summarize progress made in current session    11. Measures:    Mental Status Exam  Alertness: alert  and oriented  Behavior/Demeanor: cooperative and pleasant  Speech: regular rate and rhythm  Language: intact and no obvious problem.   Mood: depressed and description consistent with euthymia   Thought Process/Associations: unremarkable  Thought Content:  Reports preoccupations and obsessions ;  Denies suicidal ideation, violent ideation and delusions  Perception:  Reports auditory hallucinations without commands [details in Interim History], visual hallucinations and tactile hallucinations (no significant changes);  Denies depersonalization and derealization  Insight: fair  Judgment: fair  Cognition: does  appear grossly intact; formal cognitive testing was not done    12. Assessment/Progress Note:     Writer met with Bob via telehealth on this date for IRT. Writer set agenda to check-in, discuss and assess symptoms, explore material in IRT modules, discuss ways in which Bob can expand coping strategies and stress-management techniques for ongoing symptom management, and check-in regarding goals for ongoing recovery. During check-in, Bob described the last week as \"pretty good.\" Denies any changes to psychosis, is managing symptoms well. Denies SI/SH and denies HI/VI. Spent time in session discussing the topic of eventual graduation from [employer]. Bob reflected on progress he has made in the program and also wondered if he has made enough progress. Writer offered space for him to process and explore. Bob expressed some potential self-stigmatizing beliefs regarding what he is doing right now in his life versus what others " "are doing or what society suggests he \"should\" be doing right now. Worked to challenge these ideas and emphasized everyone's process is different. Offered space for Bob to share if he wishes he was in a different place right now in life and reminded him there is consistent opportunity daily to be moving in the direction of our goals/hopes for our life. Bob was open and receptive to this. He became tearful as he shared, but continued to engage regardless. Writer provided validation, support and re-framing where appropriate. Utilized primarily an insight-oriented, strengths-based approach to promote and enhance self-awareness, understanding and acceptance. Overall, Bob was open and engaged throughout the session. Symptom assessment, safety assessment, discussion and identification of coping strategies, and exploration of material in IRT modules was all in support of Bob's self-identified goal(s) as identified in most recent BEH Treatment Plan. Progress toward goal completion seems fair.    13. Plan/Referrals:     Next session scheduled in one week.     Client aware they can reach out to writer directly and/or [employer] team should concerns or needs arise prior to next scheduled appointment.     Billing for \"Interactive Complexity\"?    No      MADELYN Scales    [facility] Individual Resiliency Trainer  "

## 2022-05-06 ENCOUNTER — VIRTUAL VISIT (OUTPATIENT)
Dept: PSYCHIATRY | Facility: CLINIC | Age: 22
End: 2022-05-06
Payer: MEDICAID

## 2022-05-06 DIAGNOSIS — F29 PSYCHOSIS, UNSPECIFIED PSYCHOSIS TYPE (H): Primary | ICD-10-CM

## 2022-05-06 NOTE — PROGRESS NOTES
[facility] Clinician Contact & Progress Note  For Individual Resiliency Training (IRT)  A Part of the [facility] First Episode of Psychosis Program    [facility] Enrollee: Bob Das (2000)     MRN: [id number]  Date:  5/06/22  Diagnosis: Psychosis, unspecified F29.0  Clinician: [facility] Individual Resiliency Trainer, MADELYN Scales     1. Type of contact: (majority of time spent)  IRT Session via telehealth  Mode of communication: [email or web address] (HIPAA compliant, secure platform). Patient consented verbally to this mode of therapy today.   Reason for telehealth: COVID-19. This patient visit was converted to a telehealth visit to minimize exposure to COVID-19.    2. People present:   Writer  Client: Yes    3. Length of Actual Contact: Start Time: 9am; End Time: 10:02am     4. Location of contact:  Originating Location (patient location): [place], located in [place], [place] (writer obtained [place] license #[id number])  Distant Location (provider location): Home office, located in [place], [place], using appropriate privacy considerations and procedures    5. Did the client complete the home practice option(s) from the previous session: Completed    6. Motivational Teaching Strategies:  Connect info and skills with personal goals  Promote hope and positive expectations  Explore pros and cons of change  Re-frame experiences in positive light    7. Educational Teaching Strategies:  Review of written material/education  Relate information to client's experience  Ask questions to check comprehension  Break down information into small chunks  Adopt client's language     8. CBT Teaching Strategies:  Reinforcement and shaping (positive feedback for steps towards goals and gains in knowledge & skills)  Relapse prevention planning (review of stressors and early warning signs)  Coping skills training (review current coping skills and increase currently used skills)  Behavioral tailoring (fit taking medication into client's daily routine)    9.  IRT Module(s) Addressed:  Resiliency  Good Things - Positive Psychology    10. Techniques utilized:   [place] announced at beginning of session  Review of goal  Review of previous meeting  Present new material  Problem-solving practice  Help client choose a home practice option  Summarize progress made in current session    11. Measures:    Mental Status Exam  Alertness: alert  and oriented  Behavior/Demeanor: cooperative and pleasant  Speech: regular rate and rhythm  Language: intact and no obvious problem.   Mood: description consistent with euthymia   Thought Process/Associations: unremarkable  Thought Content:  Reports suicidal ideation without plan; without intent [details in Interim History], preoccupations and obsessions ;  Denies violent ideation and delusions  Perception:  Reports auditory hallucinations without commands [details in Interim History], visual hallucinations and tactile hallucinations (no significant changes);  Denies depersonalization and derealization  Insight: fair  Judgment: fair  Cognition: does  appear grossly intact; formal cognitive testing was not done    KATHY-7  Over the last 2 weeks, how often have you been bothered by the following problems?    1. Feeling nervous, anxious or on edge: 1 - Several days  2. Not being able to stop or control worryin - Not at all  3. Worrying too much about different things: 1 - Several days  4. Trouble relaxin - Not at all  5. Being so restless that it is hard to sit still: 1 - Several days  6. Becoming easily annoyed or irritable: 0 - Not at all  7. Feeling afraid as if something awful might happen: 0 - Not at all    PHQ-9  Over the last 2 weeks, how often have you been bothered by any the following problems?    1. Little interest or pleasure in doing things: 0 - Not at all  2. Feeling down, depressed, or hopeless: 0 - Not at all  3. Trouble falling or staying asleep, or sleeping too much: 2 - More than half the days; sleeping too much still  4.  Feeling tired or having little energy: 1 - Several days  5. Poor appetite or overeatin - Not at all  6. Feeling bad about yourself - or that you are a failure or have let yourself or your family down: 0 - Not at all  7. Trouble concentrating on things, such as reading the newspaper or watching television: 0 - Not at all  8. Moving or speaking so slowly that other people could have noticed. Or the opposite-being fidgety or restless that you have been moving around a lot more than usual: 1 - Several days; fidgety  9. Thoughts that you would be better off dead, or of hurting yourself in some way: 0 - Not at all    If you checked off any problems, how difficulty have these problems made it for you to do your work, take care of things at home, or get along with other people? Not difficult at all    [place] Protocol Risk Identification  1) Have you wished you were dead or wished you could go to sleep and not wake up? No  2) Have you actually had any thoughts about killing yourself? No  If YES to 2, answer questions 3, 4, 5, 6  If NO to 2, go directly to question 6  3) Have you thought about how you might do this? N/A  4) Have you had any intension of acting on these thoughts of killing yourself, as opposed to you have the thoughts but you definitely would not act on them? N/A  5) Have you started to work out or worked out the details of how to kill yourself? Do you intend to carry out this plan? N/A  Always Ask Question 6  6) Have you done anything, started to do anything, or prepared to do anything to end your life? No  Examples: collected pills, obtained a gun, gave away valuables, wrote a will or suicide note, held a gun but changed your mind, cut yourself, tried to hang yourself, etc.      12. Assessment/Progress Note:     Writer met with Bob via telehealth on this date for IRT. Writer set agenda to check-in, discuss and assess symptoms, explore material in IRT modules, discuss ways in which Bob can expand  "coping strategies and stress-management techniques for ongoing symptom management, and check-in regarding goals for ongoing recovery. During check-in, completed formal assessment measures as documented above. Of note, mood seems to be improved as well as anxiety improved. Bob did report some thoughts about suicide but denies any desire, plans or intent to act on these. Denies HI/VI. Reports no changes to psychosis, feels he is managing well. Spent time in session offering space for Bob to share about the past two weeks. 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 Engaged Bob in an informal \"Good Things\" exercise from IRT Manual and positive psychology framework. Acknowledged Bob's very active role in making these good things happen and also gave space for him to reflect and savor the positive experiences. Bob also commented that he feels he can see the progress he has made over the last couple of years. Writer observed brighter mood and affect in Bob today. Informed Bob about writer's upcoming transition out of [facility] with an end date of June 24th. Will discuss options for support and services, especially considering Bob is moving towards graduation from the program, in upcoming sessions. Bob was open and receptive to this. Scheduled again in one week. Overall, Bob was open and engaged throughout the session. Symptom assessment, safety assessment, discussion and identification of coping strategies, and exploration of material in IRT modules was all in support of Bob's self-identified goal(s) as identified in most recent BEH Treatment Plan. Progress toward goal completion seems fair.    13. Plan/Referrals:     Next session scheduled in one week.    Client " "aware they can reach out to writer directly and/or [employer] team should concerns or needs arise prior to next scheduled appointment.     Billing for \"Interactive Complexity\"?    No      MADELYN Scales    [employer] Individual Resiliency Trainer  "

## 2022-05-16 ENCOUNTER — VIRTUAL VISIT (OUTPATIENT)
Dept: PSYCHIATRY | Facility: CLINIC | Age: 22
End: 2022-05-16
Payer: MEDICAID

## 2022-05-16 DIAGNOSIS — F29 PSYCHOSIS, UNSPECIFIED PSYCHOSIS TYPE (H): Primary | ICD-10-CM

## 2022-05-16 NOTE — PROGRESS NOTES
[facility] Clinician Contact & Progress Note  For Individual Resiliency Training (IRT)  A Part of the [facility] First Episode of Psychosis Program    [facility] Enrollee: Bob Das (2000)     MRN: [id number]  Date:  5/16/22  Diagnosis: Psychosis, unspecified F29.0  Clinician: [facility] Individual Resiliency Trainer, MADELYN Scales     1. Type of contact: (majority of time spent)  IRT Session via telehealth  Mode of communication: [email or web address] (HIPAA compliant, secure platform). Patient consented verbally to this mode of therapy today.   Reason for telehealth: COVID-19. This patient visit was converted to a telehealth visit to minimize exposure to COVID-19.    2. People present:   Writer  Client: Yes    3. Length of Actual Contact: Start Time: 10:30am; End Time: 11:20am     4. Location of contact:  Originating Location (patient location): [facility], located in [place], [place] (writer obtained [place] license #[id number])  Distant Location (provider location): Home office, located in [place], [place], using appropriate privacy considerations and procedures    5. Did the client complete the home practice option(s) from the previous session: Completed    6. Motivational Teaching Strategies:  Connect info and skills with personal goals  Promote hope and positive expectations  Explore pros and cons of change  Re-frame experiences in positive light    7. Educational Teaching Strategies:  Review of written material/education  Relate information to client's experience  Ask questions to check comprehension  Break down information into small chunks  Adopt client's language     8. CBT Teaching Strategies:  Reinforcement and shaping (positive feedback for steps towards goals and gains in knowledge & skills)  Relapse prevention planning (review of stressors and early warning signs)  Coping skills training (review current coping skills and increase currently used skills)  Behavioral tailoring (fit taking medication into client's daily  routine)    9. IRT Module(s) Addressed:  Coping with Symptoms   Resiliency  Goals    10. Techniques utilized:   [place] announced at beginning of session  Review of goal  Review of previous meeting  Present new material  Problem-solving practice  Help client choose a home practice option  Summarize progress made in current session    11. Measures:    Mental Status Exam  Alertness: alert  and oriented  Behavior/Demeanor: cooperative and pleasant  Speech: regular rate and rhythm  Language: intact and no obvious problem.   Mood: description consistent with euthymia   Thought Process/Associations: unremarkable  Thought Content:  Reports preoccupations and obsessions ;  Denies suicidal ideation, violent ideation and delusions  Perception:  Reports auditory hallucinations without commands [details in Interim History], visual hallucinations and tactile hallucinations (no significant changes);  Denies depersonalization and derealization  Insight: fair  Judgment: fair  Cognition: does  appear grossly intact; formal cognitive testing was not done    12. Assessment/Progress Note:     Writer met with Bob via telehealth on this date for IRT. Writer set agenda to check-in, discuss and assess symptoms, explore material in IRT modules, discuss ways in which Bob can expand coping strategies and stress-management techniques for ongoing symptom management, and check-in regarding goals for ongoing recovery. During check-in, assessed mood and symptoms. Bob reports overall his mood has been good, reports some anxiety but not significant. Reports no changes to psychosis symptoms. Denies SI/SH and denies HI/VI. Spent time in session discussing goals and options for continued support in the context of writer's departure from [facility] and Bob's graduation from the program. As of now, Bob is interested in [facility] step-down support and is aware this would include semi-regular appointments with psychiatry/med management. Explored  "additional therapeutic supports in [place]'s area. Sent information related to [facility] (therapy and med management) and [facility] both in [place]. Also provided information on [facility] and their specific therapy support related to OCD and anxiety, which could be useful to [place]. [place] was open and receptive to discussion and information provided. Scheduled again Friday. Overall, Bob was open and engaged throughout the session. Symptom assessment, safety assessment, discussion and identification of coping strategies, and exploration of material in IRT modules was all in support of Bob's self-identified goal(s) as identified in most recent BEH Treatment Plan. Progress toward goal completion seems fair.    13. Plan/Referrals:     Next session scheduled Friday.    Client aware they can reach out to writer directly and/or [facility] team should concerns or needs arise prior to next scheduled appointment.     Billing for \"Interactive Complexity\"?    No      MADELYN Scales    [facility] Individual Resiliency Trainer  "

## 2022-05-20 ENCOUNTER — VIRTUAL VISIT (OUTPATIENT)
Dept: PSYCHIATRY | Facility: CLINIC | Age: 22
End: 2022-05-20
Payer: MEDICAID

## 2022-05-20 DIAGNOSIS — F29 PSYCHOSIS, UNSPECIFIED PSYCHOSIS TYPE (H): Primary | ICD-10-CM
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
## 2022-05-21 ENCOUNTER — HEALTH MAINTENANCE LETTER (OUTPATIENT)
Age: 22
End: 2022-05-21

## 2022-05-27 ENCOUNTER — VIRTUAL VISIT (OUTPATIENT)
Dept: PSYCHIATRY | Facility: CLINIC | Age: 22
End: 2022-05-27
Payer: MEDICAID

## 2022-05-27 DIAGNOSIS — F29 PSYCHOSIS, UNSPECIFIED PSYCHOSIS TYPE (H): Primary | ICD-10-CM

## 2022-06-03 ENCOUNTER — VIRTUAL VISIT (OUTPATIENT)
Dept: PSYCHIATRY | Facility: CLINIC | Age: 22
End: 2022-06-03
Payer: MEDICAID

## 2022-06-03 DIAGNOSIS — F29 PSYCHOSIS, UNSPECIFIED PSYCHOSIS TYPE (H): Primary | ICD-10-CM

## 2022-06-03 NOTE — PROGRESS NOTES
[facility] Clinician Contact & Progress Note  For Individual Resiliency Training (IRT)  A Part of the [facility] First Episode of Psychosis Program    [facility] Enrollee: Bob Das (2000)     MRN: [id number]  Date:  6/03/22  Diagnosis: Psychosis, unspecified F29.0  Clinician: [facility] Individual Resiliency Trainer, MADELYN Scales     1. Type of contact: (majority of time spent)  IRT Session via telehealth  Mode of communication: [email or web address] (HIPAA compliant, secure platform). Patient consented verbally to this mode of therapy today.   Reason for telehealth: COVID-19. This patient visit was converted to a telehealth visit to minimize exposure to COVID-19.    2. People present:   Writer  Client: Yes    3. Length of Actual Contact: Start Time: 9am; End Time: 10am     4. Location of contact:  Originating Location (patient location): home, located in [place], [place] (writer obtained [place] license #[id number] [phone] [id number])  Distant Location (provider location): Home office, located in [place], [place], using appropriate privacy considerations and procedures    5. Did the client complete the home practice option(s) from the previous session: Completed    6. Motivational Teaching Strategies:  Connect info and skills with personal goals  Promote hope and positive expectations  Explore pros and cons of change  Re-frame experiences in positive light    7. Educational Teaching Strategies:  Review of written material/education  Relate information to client's experience  Ask questions to check comprehension  Break down information into small chunks  Adopt client's language     8. CBT Teaching Strategies:  Reinforcement and shaping (positive feedback for steps towards goals and gains in knowledge & skills)  Relapse prevention planning (review of stressors and early warning signs)  Coping skills training (review current coping skills and increase currently used skills)  Behavioral tailoring (fit taking medication into client's daily routine)    9.  IRT Module(s) Addressed:  Building a bridge to goals    10. Techniques utilized:   [place] announced at beginning of session  Review of goal  Review of previous meeting  Present new material  Problem-solving practice  Help client choose a home practice option  Summarize progress made in current session    11. Measures:    Mental Status Exam  Alertness: alert  and oriented  Behavior/Demeanor: cooperative and pleasant  Speech: regular rate and rhythm  Language: intact and no obvious problem.   Mood: description consistent with euthymia   Thought Process/Associations: unremarkable  Thought Content:  Reports preoccupations and obsessions ;  Denies suicidal ideation, violent ideation and delusions  Perception:  Reports auditory hallucinations without commands [details in Interim History], visual hallucinations and tactile hallucinations (no significant changes);  Denies depersonalization and derealization  Insight: fair  Judgment: fair  Cognition: does  appear grossly intact; formal cognitive testing was not done    KATHY-7  Over the last 2 weeks, how often have you been bothered by the following problems?    1. Feeling nervous, anxious or on edge: 1 - Several days  2. Not being able to stop or control worryin - Not at all  3. Worrying too much about different things: 0 - Not at all  4. Trouble relaxin - Not at all  5. Being so restless that it is hard to sit still: 0 - Not at all   6. Becoming easily annoyed or irritable: 0 - Not at all  7. Feeling afraid as if something awful might happen: 1 - Several days; just when going out for a drive    PHQ-9  Over the last 2 weeks, how often have you been bothered by any the following problems?    1. Little interest or pleasure in doing things: 0 - Not at all  2. Feeling down, depressed, or hopeless: 0 - Not at all  3. Trouble falling or staying asleep, or sleeping too much: 1 - Several days; maybe sleeping a bit too much  4. Feeling tired or having little energy: 0 - Not at  all  5. Poor appetite or overeatin - Not at all  6. Feeling bad about yourself - or that you are a failure or have let yourself or your family down: 0 - Not at all  7. Trouble concentrating on things, such as reading the newspaper or watching television: 0 - Not at all  8. Moving or speaking so slowly that other people could have noticed. Or the opposite-being fidgety or restless that you have been moving around a lot more than usual: 0 - Not at all  9. Thoughts that you would be better off dead, or of hurting yourself in some way: 1 - Several days    If you checked off any problems, how difficulty have these problems made it for you to do your work, take care of things at home, or get along with other people? Not difficult at all    [place] Protocol Risk Identification  1) Have you wished you were dead or wished you could go to sleep and not wake up? No  2) Have you actually had any thoughts about killing yourself? No  If YES to 2, answer questions 3, 4, 5, 6  If NO to 2, go directly to question 6  3) Have you thought about how you might do this? No  4) Have you had any intension of acting on these thoughts of killing yourself, as opposed to you have the thoughts but you definitely would not act on them? No  5) Have you started to work out or worked out the details of how to kill yourself? Do you intend to carry out this plan? No  Always Ask Question 6  6) Have you done anything, started to do anything, or prepared to do anything to end your life? No  Examples: collected pills, obtained a gun, gave away valuables, wrote a will or suicide note, held a gun but changed your mind, cut yourself, tried to hang yourself, etc.      12. Assessment/Progress Note:     Writer met with [place] via telehealth on this date for IRT. Writer set agenda to check-in, discuss and assess symptoms, explore material in IRT modules, discuss ways in which [place] can expand coping strategies and stress-management techniques for ongoing  "symptom management, and check-in regarding goals for ongoing recovery. During check-in, completed formal assessment measures as documented above. Denies SI/SH and denies HI/VI. Reports no changes to psychosis. Denies any substance use. Spent time in session  discussing [facility]'s [facility] and exploring options for continued support. Bob is interested in engaging in [facility] step-down clinic with plan for med visits and therapy check-ins at the most 1x/month. Spent time processing some of Bob's anxiety related to driving and considering how this may impact him in the future and potentially impede on his process towards his goals. Shared recommendations for coping should he be interested in the future including examining his thoughts and worries about driving, restructuring them if possible and also working to related to those thoughts in different ways (cognitive defusion) so that they don't control his behavior. Bob was open and receptive. He also shared positively that he has been interacting with his sister more and went to two of her dance recitals. Commented they are \"genuinely entertaining\" and shared he is really enjoying himself right now in life. Spent remaining time in session offering space for Bob to share about dynamics in his relationship with Dad over time. Writer provided validation, support and re-framing where appropriate. Utilized primarily an insight-oriented, strengths-based approach to promote and enhance self-awareness, understanding and acceptance. Overall, Bob was open and engaged throughout the session. Symptom assessment, safety assessment, discussion and identification of coping strategies, and exploration of material in IRT modules was all in support of Bob's self-identified goal(s) as identified in most recent BEH Treatment Plan. Progress toward goal completion seems fair.    13. Plan/Referrals:     Next session scheduled Friday.    Client aware they can " "reach out to writer directly and/or [facility] team should concerns or needs arise prior to next scheduled appointment.     Billing for \"Interactive Complexity\"?    No      MADELYN Scales    [facility] Individual Resiliency Trainer  "

## 2022-06-10 ENCOUNTER — VIRTUAL VISIT (OUTPATIENT)
Dept: PSYCHIATRY | Facility: CLINIC | Age: 22
End: 2022-06-10
Payer: MEDICAID

## 2022-06-10 DIAGNOSIS — F29 PSYCHOSIS, UNSPECIFIED PSYCHOSIS TYPE (H): Primary | ICD-10-CM

## 2022-06-10 NOTE — PROGRESS NOTES
NAVIGNICO Clinician Contact & Progress Note  For Individual Resiliency Training (IRT)  A Part of the [facility] First Episode of Psychosis Program    NAVIGATE Enrollee: Bob Das (2000)     MRN: [id number]  Date:  6/10/22  Diagnosis: Psychosis, unspecified F29.0  Clinician: ADRI Individual Resiliency Trainer, MADELYN Scales     1. Type of contact: (majority of time spent)  IRT Session via telehealth  Mode of communication: [email or web address] (HIPAA compliant, secure platform). Patient consented verbally to this mode of therapy today.   Reason for telehealth: COVID-19. This patient visit was converted to a telehealth visit to minimize exposure to COVID-19.    2. People present:   Writer  Client: Yes    3. Length of Actual Contact: Start Time: 9am; End Time: 9:41am     4. Location of contact:  Originating Location (patient location): [place], located in [place], [place] (writer obtained [place] license #[id number])  Distant Location (provider location): Home office, located in [place], [place], using appropriate privacy considerations and procedures    5. Did the client complete the home practice option(s) from the previous session: Completed    6. Motivational Teaching Strategies:  Connect info and skills with personal goals  Promote hope and positive expectations  Explore pros and cons of change  Re-frame experiences in positive light    7. Educational Teaching Strategies:  Review of written material/education  Relate information to client's experience  Ask questions to check comprehension  Break down information into small chunks  Adopt client's language     8. CBT Teaching Strategies:  Reinforcement and shaping (positive feedback for steps towards goals and gains in knowledge & skills)  Relapse prevention planning (review of stressors and early warning signs)  Coping skills training (review current coping skills and increase currently used skills)  Behavioral tailoring (fit taking medication into client's daily routine)    9.  IRT Module(s) Addressed:  Coping with Symptoms   Goal Planning    10. Techniques utilized:   [place] announced at beginning of session  Review of goal  Review of previous meeting  Present new material  Problem-solving practice  Help client choose a home practice option  Summarize progress made in current session    11. Measures:    Mental Status Exam  Alertness: alert  and oriented  Behavior/Demeanor: cooperative and pleasant  Speech: regular rate and rhythm  Language: intact and no obvious problem.   Mood: description consistent with euthymia   Thought Process/Associations: unremarkable  Thought Content:  Reports preoccupations and obsessions ;  Denies suicidal ideation, violent ideation and delusions  Perception:  Reports auditory hallucinations without commands [details in Interim History], visual hallucinations and tactile hallucinations (no significant changes);  Denies depersonalization and derealization  Insight: fair  Judgment: fair  Cognition: does  appear grossly intact; formal cognitive testing was not done    KATHY-7  Over the last 2 weeks, how often have you been bothered by the following problems?    1. Feeling nervous, anxious or on edge: 0 - Not at all  2. Not being able to stop or control worryin - Not at all  3. Worrying too much about different things: 2 - More than half the days  4. Trouble relaxin - Not at all  5. Being so restless that it is hard to sit still: 0 - Not at all   6. Becoming easily annoyed or irritable: 0 - Not at all  7. Feeling afraid as if something awful might happen: 0 - Not at all    PHQ-9  Over the last 2 weeks, how often have you been bothered by any the following problems?    1. Little interest or pleasure in doing things: 0 - Not at all  2. Feeling down, depressed, or hopeless: 0 - Not at all  3. Trouble falling or staying asleep, or sleeping too much: 1 - Several days; combination of both  4. Feeling tired or having little energy: 0 - Not at all  5. Poor appetite  or overeatin - Not at all  6. Feeling bad about yourself - or that you are a failure or have let yourself or your family down: 0 - Not at all  7. Trouble concentrating on things, such as reading the newspaper or watching television: 0 - Not at all  8. Moving or speaking so slowly that other people could have noticed. Or the opposite-being fidgety or restless that you have been moving around a lot more than usual: 0 - Not at all  9. Thoughts that you would be better off dead, or of hurting yourself in some way: 0 - Not at all    If you checked off any problems, how difficulty have these problems made it for you to do your work, take care of things at home, or get along with other people? Not difficult at all    [place] Protocol Risk Identification  1) Have you wished you were dead or wished you could go to sleep and not wake up? No  2) Have you actually had any thoughts about killing yourself? Yes  If YES to 2, answer questions 3, 4, 5, 6  If NO to 2, go directly to question 6  3) Have you thought about how you might do this? No  4) Have you had any intension of acting on these thoughts of killing yourself, as opposed to you have the thoughts but you definitely would not act on them? No  5) Have you started to work out or worked out the details of how to kill yourself? Do you intend to carry out this plan? No  Always Ask Question 6  6) Have you done anything, started to do anything, or prepared to do anything to end your life? No  Examples: collected pills, obtained a gun, gave away valuables, wrote a will or suicide note, held a gun but changed your mind, cut yourself, tried to hang yourself, etc.      12. Assessment/Progress Note:     Writer met with Joaquin via telehealth on this date for IRT. Writer set agenda to check-in, discuss and assess symptoms, explore material in IRT modules, discuss ways in which Joaquin can expand coping strategies and stress-management techniques for ongoing symptom management, and  "check-in regarding goals for ongoing recovery. During check-in, completed formal assessment measures as documented above. Denies SI/SH and denies HI/VI. Reports no changes to psychosis. Denies any substance use. Spent time in session offering space for Bob to share about his past week and reflect on progress and growth over time. He shared positively about having a consistently good mood for a while and commented feeling he has a lot to be thankful for. He shared about the process of letting go of things that cause him unnecessary stress. Writer emphasized Bob's use of skills in doing this and offered validation. Spent time considering when might be a good time to start looking for a job and Bob is still not sure. 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 Will continue to explore together. Writer provided validation, support and re-framing where appropriate. Utilized primarily an insight-oriented, strengths-based approach to promote and enhance self-awareness, understanding and acceptance. Overall, Bob was open and engaged throughout the session. Symptom assessment, safety assessment, discussion and identification of coping strategies, and exploration of material in IRT modules was all in support of Bob's self-identified goal(s) as identified in most recent BEH Treatment Plan. Progress toward goal completion seems fair.    13. Plan/Referrals:     Next session scheduled Friday.    Client aware they can reach out to writer directly and/or [facility]IGATE team should concerns or needs arise prior to next scheduled appointment.     Billing for \"Interactive Complexity\"?    No      Nancy Rubin [facility]KK[facility]    NAVIGATE Individual Resiliency Trainer  "

## 2022-06-15 NOTE — PROGRESS NOTES
NAVIGNICO Clinician Contact & Progress Note  For Individual Resiliency Training (IRT)  A Part of the [facility] First Episode of Psychosis Program    NAVIGATE Enrollee: Bob Das (2000)     MRN: [id number]  Date:  5/27/22  Diagnosis: Psychosis, unspecified F29.0  Clinician: ADRI Individual Resiliency Trainer, MADELYN Scales     1. Type of contact: (majority of time spent)  IRT Session via telehealth  Mode of communication: [email or web address] (HIPAA compliant, secure platform). Patient consented verbally to this mode of therapy today.   Reason for telehealth: COVID-19. This patient visit was converted to a telehealth visit to minimize exposure to COVID-19.    2. People present:   Writer  Client: Yes    3. Length of Actual Contact: Start Time: 9am; End Time: 9:16am     4. Location of contact:  Originating Location (patient location): [place], located in [place], [place] (writer obtained [place] license #[id number])  Distant Location (provider location): Home office, located in [place], [place], using appropriate privacy considerations and procedures    5. Did the client complete the home practice option(s) from the previous session: Completed    6. Motivational Teaching Strategies:  Connect info and skills with personal goals  Promote hope and positive expectations  Explore pros and cons of change  Re-frame experiences in positive light    7. Educational Teaching Strategies:  Review of written material/education  Relate information to client's experience  Ask questions to check comprehension  Break down information into small chunks  Adopt client's language     8. CBT Teaching Strategies:  Reinforcement and shaping (positive feedback for steps towards goals and gains in knowledge & skills)  Relapse prevention planning (review of stressors and early warning signs)  Coping skills training (review current coping skills and increase currently used skills)  Behavioral tailoring (fit taking medication into client's daily routine)    9.  "IRT Module(s) Addressed:  [place] check-in; per Bob's request    10. Techniques utilized:   [place] announced at beginning of session  Review of goal  Review of previous meeting  Present new material  Problem-solving practice  Help client choose a home practice option  Summarize progress made in current session    11. Measures:    Mental Status Exam  Alertness: alert  and oriented  Behavior/Demeanor: cooperative and pleasant  Speech: regular rate and rhythm  Language: intact and no obvious problem.   Mood: description consistent with euthymia   Thought Process/Associations: unremarkable  Thought Content:  Reports preoccupations and obsessions ;  Denies suicidal ideation, violent ideation and delusions  Perception:  Reports auditory hallucinations without commands [details in Interim History], visual hallucinations and tactile hallucinations (no significant changes);  Denies depersonalization and derealization  Insight: fair  Judgment: fair  Cognition: does  appear grossly intact; formal cognitive testing was not done    12. Assessment/Progress Note:     Writer met with Bob virtually today for IRT. Bob shared positively about his past week and requested a shorter check-in for today. Denies SI/SH and HI/VI. Denies any changes to experiences of psychosis. Reports he caught a cold from his Mom, which has been annoying but overall has had a good week. Shared positively that he has been playing a new game that he has been waiting to be released. No concerns or stressors reported today. Confirmed next session in one week.    13. Plan/Referrals:     Next session in one week.    Client aware they can reach out to writer directly and/or [facility]IG[facility] team should concerns or needs arise prior to next scheduled appointment.     Billing for \"Interactive Complexity\"?    No      MADELYN Scales    NAVIGATE Individual Resiliency Trainer  "

## 2022-06-17 ENCOUNTER — VIRTUAL VISIT (OUTPATIENT)
Dept: PSYCHIATRY | Facility: CLINIC | Age: 22
End: 2022-06-17
Payer: MEDICAID

## 2022-06-17 DIAGNOSIS — F29 PSYCHOSIS, UNSPECIFIED PSYCHOSIS TYPE (H): Primary | ICD-10-CM

## 2022-06-17 NOTE — PROGRESS NOTES
NAVIGNICO Clinician Contact & Progress Note  For Individual Resiliency Training (IRT)  A Part of the [facility] First Episode of Psychosis Program    NAVIGATE Enrollee: Bob Das (2000)     MRN: [id number]  Date:  6/17/22  Diagnosis: Psychosis, unspecified F29.0  Clinician: ADRI Individual Resiliency Trainer, MADELYN Scales     1. Type of contact: (majority of time spent)  IRT Session via telehealth  Mode of communication: [email or web address] (HIPAA compliant, secure platform). Patient consented verbally to this mode of therapy today.   Reason for telehealth: COVID-19. This patient visit was converted to a telehealth visit to minimize exposure to COVID-19.    2. People present:   Writer  Client: Yes    3. Length of Actual Contact: Start Time: 9am; End Time: 9:56am     4. Location of contact:  Originating Location (patient location): [place], located in [place], [place] (writer obtained [place] license #[id number])  Distant Location (provider location): Home office, located in [place], [place], using appropriate privacy considerations and procedures    5. Did the client complete the home practice option(s) from the previous session: Completed    6. Motivational Teaching Strategies:  Connect info and skills with personal goals  Promote hope and positive expectations  Explore pros and cons of change  Re-frame experiences in positive light    7. Educational Teaching Strategies:  Review of written material/education  Relate information to client's experience  Ask questions to check comprehension  Break down information into small chunks  Adopt client's language     8. CBT Teaching Strategies:  Reinforcement and shaping (positive feedback for steps towards goals and gains in knowledge & skills)  Relapse prevention planning (review of stressors and early warning signs)  Coping skills training (review current coping skills and increase currently used skills)  Behavioral tailoring (fit taking medication into client's daily routine)    9.  "IRT Module(s) Addressed:  Goal Planning  Resiliency    10. Techniques utilized:   [place] announced at beginning of session  Review of goal  Review of previous meeting  Present new material  Problem-solving practice  Help client choose a home practice option  Summarize progress made in current session    11. Measures:    Mental Status Exam  Alertness: alert  and oriented  Behavior/Demeanor: cooperative and pleasant  Speech: regular rate and rhythm  Language: intact and no obvious problem.   Mood: description consistent with euthymia   Thought Process/Associations: unremarkable  Thought Content:  Reports preoccupations;  Denies suicidal ideation, violent ideation and delusions  Perception:  Reports auditory hallucinations without commands [details in Interim History], visual hallucinations and tactile hallucinations (no significant changes);  Denies depersonalization and derealization  Insight: fair  Judgment: fair  Cognition: does  appear grossly intact; formal cognitive testing was not done    12. Assessment/Progress Note:     Writer met with Bob via telehealth on this date for IRT. Writer set agenda to check-in, discuss and assess symptoms, explore material in IRT modules, discuss ways in which Bob can expand coping strategies and stress-management techniques for ongoing symptom management, and check-in regarding goals for ongoing recovery. During check-in, assessed symptoms. Bob reports no changes to psychosis; reports AH a couple times/week in the form of murmurs that he cannot understand. Denies any command AH. Denies VH and reports TH on one occasion. Denies any paranoia. Describes mood as \"pretty good\" and reports \"a little\" anxiety. Denies SI/SH and denies HI/VI. Spent time in session exploring Bob's resiliency and strengths. Bob shared about the impact one's perspective has on their daily life and feels perspective is a choice one makes. He describe feeling positively about shedding bitterness and " "nurturing parts of himself that he wants to see grow. Writer provided validation, support and re-framing where appropriate. Utilized primarily an insight-oriented, strengths-based approach to promote and enhance self-awareness, understanding and acceptance. Last IRT scheduled in one week in-person. Overall, Bob was open and engaged throughout the session. Symptom assessment, safety assessment, discussion and identification of coping strategies, and exploration of material in IRT modules was all in support of [place]'s self-identified goal(s) as identified in most recent BEH Treatment Plan. Progress toward goal completion seems fair.    13. Plan/Referrals:     Last IRT scheduled in one week in-person. [place] will then engage in step-down clinic.    Client aware they can reach out to writer directly and/or [facility]IG[facility] team should concerns or needs arise prior to next scheduled appointment.     Billing for \"Interactive Complexity\"?    No      MADELYN Scales    NAVIGATE Individual Resiliency Trainer  "

## 2022-06-22 NOTE — PROGRESS NOTES
[facility] NAVIGATE Program Treatment Plan Summary  A Part of the [facility] First Episode of Psychosis Program     NAVIGATE Enrollee: Bob Das  /Age:  2000 (19 year old)  MRN: [id number]    Date of Initial Services:  19  Date of INTIAL Treatment Plan: 3/5/19  Last Review/Update Date:  10/8/21  Today's Date: 22  Next 90-Day Review Due: 22    The following represents an updated and reviewed treatment plan.    1. 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 Current symptoms and circumstances that substantiate the diagnosis    Bob has a history of psychosis (paranoia, delusions, thought broadcasting, thought insertion, delusions of control, ideas of reference, odd beliefs per family/friends, auditory hallucinations, command auditory hallucinations, visual hallucinations, tactile hallucinations and negative symptoms (diminished emotional expression)), anxiety and SI. He presents with current symptoms of psychosis (paranoia, ideas of reference, auditory hallucinations, visual hallucinations, tactile hallucinations and negative symptoms (diminished emotional expression)) and anxiety. At times currently experiences passive SI and depressed mood.    3. How symptoms and/or behaviors are affecting level of function    Bob s systems are impacting functioning with respect to social relationships, familial relationships, employment and academics.    4. Risk Assessment:  Suicide:  Assessed Level of Immediate Risk: Medium  Ideation: No  Plan:  No  Means: No  Intent: No    Homicide/Violence:  Assessed Level of Immediate Risk: Low  Ideation: No  Plan: No  Means: No  Intent: No    5. Medications    No current outpatient medications on file.     No current facility-administered medications for this visit.     ** Not taking medications as of 2021.   6. Strengths     Supportive friends, family, recovery environment  Caution,  Prudence, & Discretion    Curiosity    Forgiveness & Mercy  Honest, Authentic, Genuine    Humor & Playfulness   Hope & Optimism      Industry, diligence, & perseverance    Kind & Generous    Self-control & Self-regulation      7. Barriers & Suicide Risk Factors     Command Hallucinations  Communication, limited to no communication with family/support network  Depression and/or Hopelessness  Environmental Stress (e.g. family conflict or criticism)  Male  SI  SIB  Symptoms of psychosis, positive (delusions and/or hallucinations)  Symptoms of psychosis, negative (flat affect, avolition, anhedonia, alogia, and/or apathy)  Symptoms of psychosis, cognitive (memory, attention and concentration, and/or executive functioning difficulties)  Treatment Non-Adherence     8. Treatment Domains and Goals    Domain 1: Illness Management & Recovery  Identify and engage possible areas of improvement related to medication optimization, psychosis (paranoia, delusions, thought broadcasting, thought insertion, delusions of control, ideas of reference, odd beliefs per family/friends, auditory hallucinations, command auditory hallucinations, visual hallucinations, disorganized speech, disorganized behavior and negative symptoms (diminished emotional expression, avolition, anhedonia, alogia and apathy)), anxiety, SI and SIB and ability to management illness.     Measurable Objectives Interventions Target Dates & Discharge Criteria   Medication Objectives    -Paricipate in a medication evaluation   -Take medications as prescribed and have reduced frequency and severity of symptoms  -Learn and implement strategies for overcoming barriers to taking medication  -Support system assists with overcoming barriers to taking medications     In [place]'s words:  -no specific goals, per Bob 10/8/21 (no changes 1/21/22)  -[place] is potentially open to meeting with Dr Patterson (10/8/21 - completed 11/4/21)   Medication Management  -Prescribe and monitor  medications  -Monitor and treat side effects  -Psychoeducation  -Behavioral activation  -Initial and routine lab work    IRT/Psychotherapy  -Psychoeducation  -Motivation interviewing  -CBT  -Behavioral activation    Family Therapy  -Psychoeducation  -Motivational interviewing  -Behavioral family therapy  -CBT  -Behavioral activation    Case Management  -NA or None   Target date:   6 months from 1/21/22    Discharge criteria:  Marked and sustained symptom improvement     Gains Made:  -Paricipate in a medication evaluation   -Take medications as prescribed and have reduced frequency and severity of symptoms  -Learn and implement strategies for overcoming barriers to taking medication  -Support system assists with overcoming barriers to taking medications  -was open to change in medication per prescriber recommendations  -Bob met with Dr Patterson on 11/4/21   Individual s Objectives    -Complete a safety plan with therapist and share with support system  -Define what recovery means to self  -Identify psychosocial areas of need  -Identify top 5 strengths and use those strengths when working toward goal achievement; simultaneously choose one area for improvement and identify two actionable steps toward improvement  -Create a goal plan consisting of one long-term goal, three short-term goals, and actionable steps toward short-term goal achievement  -Demonstrate understanding of psychosis (paranoia, delusions, thought broadcasting, thought insertion, delusions of control, ideas of reference, odd beliefs per family/friends, auditory hallucinations, command auditory hallucinations, visual hallucinations and negative symptoms (diminished emotional expression)), depression (difficulties with sleep, low energy and worthlessness and/or guilt), anxiety and SI in the context of self with respect to symptoms, causes, course, medications and the impact of stress  -Learn at least 2 coping strategies to successfully target current  symptoms  -Demonstrate understanding for how substance use impacts symptoms, identify stage of change, and experiment with reduced use or abstinence from all illicit substances   -Learn strategies to build positive emotions and facilitate resiliency   -Build client build resiliency through the skills of gratitude, savoring, active/constructive communication, and practicing acts of kindness.  -Develop and implement a relapse prevention plan including identification of warning signs, triggers, coping mechanisms, and how other persons can be supportive if symptoms increase or reemerge   -Process the psychotic episode by demonstrating understanding of how the episode impacted self, identifying positive coping strategies and resiliency used during that time, challenging self-stigmatizing beliefs, and developing a positive attitude towards facing future life challenges  -Process past trauma by demonstrating understanding of how the traumatic event impacted self, identifying positive coping strategies and resiliency used during that time, challenging self-stigmatizing beliefs, and developing a positive attitude towards facing future life challenges  -Identify primary styles of thinking, and demonstrate understanding of and use cognitive restructuring to successfully deal with negative feelings  -Identify persistent symptoms that interfere with activities and/or enjoyment and successfully implement two coping strategies to reduce symptoms severity  -Cooperate with the recommendations or requirements mandated by the criminal justice system     In Bob's words:  -continue striving for stability with regards to symptoms (as of  10/8/21, continue 1/21/22)  -continue to try whatever I can to help with symptom improvement (10/8/21, continue 1/21/22)     Medication Management  -Prescribe and monitor medications  -Monitor and treat side effects  -Psychoeducation  -Behavioral activation  -Initial and routine lab  work    IRT/Psychotherapy  -Psychoeducation  -Motivation interviewing  -CBT  -Behavioral activation  -Family involvement during portions of sessions    Family Therapy  -Psychoeducation  -Motivational interviewing  -Behavioral family therapy  -CBT  -Behavioral activation  -Client involvement during all or portions of sessions    Case Management  -NA or None   Target date:   12 months from 1/21/22    Discharge criteria:  Marked and sustained symptom improvement     [place] demonstrates understanding of mental illness     Bob successfully implements strategies to cope with stressors and/or symptoms to mitigate risk for increase in symptom severity or relapse    Gains Made:  -Complete a safety plan with therapist and share with support system  -Define what recovery means to self  -Identify psychosocial areas of need  -Identify top 5 strengths and use those strengths when working toward goal achievement; simultaneously choose one area for improvement and identify two actionable steps toward improvement  -Create a goal plan consisting of one long-term goal, three short-term goals, and actionable steps toward short-term goal achievement  -Demonstrate understanding of psychosis (auditory hallucinations, visual hallucinations and tactile hallucinations) and anxiety in the context of self with respect to symptoms, causes, course, medications and the impact of stress  -Learn at least 2 coping strategies to successfully target current symptoms  -Learn strategies to build positive emotions and facilitate resiliency   -Identify primary styles of thinking, and demonstrate understanding of and use cognitive restructuring to successfully deal with negative feelings  -Identify persistent symptoms that interfere with activities and/or enjoyment and successfully implement two coping strategies to reduce symptoms severity  -Bob has continued to be open to therapy  -Bob has increased coping related to symptoms as of 1/21/22     Support  System Objectives    -Supports increase the safety of the home by removing firearms or other lethal weapons from the client's easy access   -Supports agree to provide supervision and monitor suicidal potential   -Supports, including family members, terminate any hostile, critical responses to the client and increase their statements of praise, optimism, and affirmation   -Supports, including family members, verbalize realistic expectations and discipline methods   -Supports, including family members, verbally reinforce the client's active attempts to build self-esteem and rapport   -Supports verbalize increased understanding of an knowledge about the client's illness and treatment   -Identify psychosocial areas of need  -Verbalize understanding of the client's long-term and short-term goals  -Demonstrate understanding of psychosis (paranoia, delusions, thought broadcasting, thought insertion, delusions of control, ideas of reference, odd beliefs per family/friends, auditory hallucinations, command auditory hallucinations and visual hallucinations) and SI in the context of the client with respect to symptoms, causes, course, medications and the impact of stress  -Learn the client's signs of stress and possible coping skills  -Demonstrate understanding for how substance use impacts symptoms and how to support decrease in or abstinence from illicit substance use  -Learn skills that strengthen and support the client's positive behavior change  -Learn strategies to build positive emotions and facilitate resiliency including use of a resiliency story  -Develop and implement a relapse prevention plan including identification of warning signs, triggers, coping mechanisms, and how other persons can be supportive if symptoms increase or reemerge   -Learn and implement communication skills to enhance communication and respect among family members  -Learn and implement problem-solving and/or conflict resolution skills to manage  familial, personal and interpersonal problems constructively   Medication Management  -Prescribe and monitor medications  -Monitor and treat side effects  -Psychoeducation  -Behavioral activation  -Initial and routine lab work    IRT/Psychotherapy  -Psychoeducation  -Motivation interviewing  -CBT  -Behavioral activation  -Family involvement during portions of sessions    Family Therapy  -Psychoeducation  -Motivational interviewing  -Behavioral family therapy  -CBT  -Behavioral activation  -Client involvement during all or portions of sessions    Case Management  -NA or None   Target date:   6 months from 1/21/22    Discharge criteria:  Support system demonstrates understanding of mental illness     Support system successfully implements strategies to assist Bob cope with stressors and/or symptoms to mitigate risk for increase in symptom severity or relapse     Gains Made:  -Supports increase the safety of the home by removing firearms or other lethal weapons from the client's easy access   -Supports agree to provide supervision and monitor suicidal potential   -Supports, including family members, verbalize realistic expectations and discipline methods   -Supports, including family members, verbally reinforce the client's active attempts to build self-esteem and rapport   -Supports verbalize increased understanding of an knowledge about the client's illness and treatment   -Demonstrate understanding of psychosis (paranoia, delusions, auditory hallucinations, visual hallucinations and tactile hallucinations), SI and low self-esteem in the context of the client with respect to symptoms, causes, course, medications and the impact of stress  -Learn skills that strengthen and support the client's positive behavior change  -Learn and implement communication skills to enhance communication and respect among family members       Domain 2: Health & Basic Living Needs  Identify and engage possible areas of improvement related  to basic needs being met and maintaining or improving overall health and well-being     Measurable Objectives Interventions Discharge Criteria   -Verbalize an accurate understanding of factors influencing eating, health, and weight  -Learn and implement at least healthy nutritional practices  -Track and chart weight on a routine interval throughout therapy   -Learn and implement at least 2 skills to promote health sleep  -Establish and adhere to a plan to increase physical exercise    In Bob's words:  -take steps to approach independence (10/8/21, keep 1/21/22)  -leave the house and go out and about more (as of 1/21/22)   Medication Management  -Prescribe and monitor medications  -Monitor and treat side effects  -Psychoeducation  -Behavioral activation  -Initial and routine lab work    IRT/Psychotherapy  -Psychoeducation  -Motivation interviewing  -CBT  -Behavioral activation  -Family involvement during portions of sessions    Family Therapy  -Psychoeducation  -Motivational interviewing  -Behavioral family therapy  -CBT  -Behavioral activation  -Client involvement during all or portions of sessions    Supported Education & Employment  -Motivational interviewing  -Individualized placement and support   -Behavioral Activation  -Family involvement    Case Management  -NA or None   Target date:   12 months from 1/21/22    Discharge criteria:  Bob, his supports and treatment team report no unmet health and basic living needs    Gains Made:  -Verbalize an accurate understanding of factors influencing eating, health, and weight  -Learn and implement at least 2 skills to promote health sleep  -Independently arrange transportation to/from appointments   -Bob got his own bank account  -[place] has been open to exploring ways of effective communication in session (as of 12/23/20)  -keep a daily to do list and work on my art (has continued to work on art as of 10/8/21)     Domain 3: Family & Other Supports  Identify and engage  "possible areas of improvement related to engaging family, friends and other supports     Measurable Objectives Interventions Discharge Criteria   -Identify support system  -Invite support system to be involved in treatment  -Participate in family therapy  -Improve the quality of relationships by developing skills to better understand other people, communicate more effectively, manage disclosure, and understand social cues  -Increase communication with the parents, resulting in feeling attended to and understood  -Increase the frequency of positive interactions with parents  -Learn and implement problem-solving and/or conflict resolution skills to manage personal and interpersonal problems constructively  -Identify and implement two approaches to how strengths and interests can be used to initiate social contacts and develop peer friendships  -Learn and implement calming and coping strategies to manage anxiety symptoms and focus attention usefully during moments of social anxiety    -Strengthen the social support network with friends by initiating social contact and participating in social activities with peers   -Increase participation in interpersonal or peer group activities   -Renew two old fun activities or develop two new fun activity     In [place]'s words:  -\"coexist with Mom\" (as of 10/8/21, keep 1/21/22)  -stay connected with friend abroad and online community (10/8/21, keep 1/21/22)   Medication Management  -Prescribe and monitor medications  -Monitor and treat side effects  -Psychoeducation  -Behavioral activation  -Initial and routine lab work    IRT/Psychotherapy  -Psychoeducation  -Motivation interviewing  -CBT  -Behavioral activation  -Family involvement during portions of sessions    Family Therapy  -Psychoeducation  -Motivational interviewing  -Behavioral family therapy  -CBT  -Behavioral activation  -Client involvement during all or portions of sessions    Supported Education & " Employment  -Motivational interviewing  -Individualized placement and support   -Behavioral Activation  -Family involvement    Case Management  -NA or None   Target date:   12 months from 1/21/22    Discharge criteria:  Bob and his support system report feeling equipped with the necessary skills to communicate and problem solve during times of disagreement    Conflict with supports and peers are resolved constructively and consistently over time; 6 months    Gains Made:  -Identify support system  -Invite support system to be involved in treatment  -Improve the quality of relationships by developing skills to better understand other people, communicate more effectively, manage disclosure, and understand social cues  -Increase the frequency of positive interactions with parents  -Learn and implement problem-solving and/or conflict resolution skills to manage personal and interpersonal problems constructively  -Increase participation in interpersonal or peer group activities   -Renew two old fun activities or develop two new fun activity  -Bob has been processing more related to Mom in sessions  -Bob has explored ways to manage conflict and tension with Mom in sessions  -Bob has been sharing more in sessions with writer  -has been connecting with a friend and online community (as of 10/8/21)     Domain 4: Academic and Employment  Identify and engage possible areas of improvement relates to education and employment     Measurable Objectives Interventions Discharge Criteria   -Stay current with schoolwork, completing assignments and interacting appropriately with peers and teachers   -Utilize accommodations, effective study and test-taking skills on a regular basis to improve academic performance  -Increase participate in school-related activities   -Obtain/maintain gainful employment   -Supports offer assistance in developing and utilize an organized system to keep track of the client's work schedules, school  "assignments, chores, and/or household responsibilities  -Family members provide praise, encouragement for the client's attempts and successes at school/work     In [place]'s words:  -start a web comic related to mental health or psychosis (as of 1/21/22)  -Move towards independence (keep as of 1/21/22)    Previous, no longer relevant:  -\"keep job at [employer]\"  -\"explore [employer] schools in the area with [place]\" on hold as of 9/23/20  -\"look for [employer]\" in process 9/23/20  -Bob is looking into other job opportunities right now as of 9/23/20  -Move towards independence Medication Management  -Prescribe and monitor medications  -Monitor and treat side effects  -Psychoeducation  -Behavioral activation  -Initial and routine lab work    IRT/Psychotherapy  -Psychoeducation  -Motivation interviewing  -CBT  -Behavioral activation  -Family involvement during portions of sessions    Family Therapy  -Psychoeducation  -Motivational interviewing  -Behavioral family therapy  -CBT  -Behavioral activation  -Client involvement during all or portions of sessions    Supported Education & Employment  -Motivational interviewing  -Individualized placement and support   -Behavioral Activation  -Family involvement    Case Management  -NA or None   Target date:   12 months from 1/21/22    Discharge criteria:  Work and school goals are achieved and maintained without follow along NAVIGATE Supported Education and Employment supports for 6 months    Gains Made:  -Explore areas of interest for continued educational opportunities   -Stay current with schoolwork, completing assignments and interacting appropriately with peers and teachers   -Utilize accommodations, effective study and test-taking skills on a regular basis to improve academic performance  -Obtain/maintain gainful employment   -Supports offer assistance in developing and utilize an organized system to keep track of the client's work schedules, school assignments, chores, and/or household " responsibilities  -Family members provide praise, encouragement for the client's attempts and successes at school/work   -Bob has explored some [employer] schools in the area  -Bob did use supports while at school  -Bob did graduate from High School       9. Frequency of sessions and expected duration of treatment:   1-4x per month Medication Management with Prescriber ongoing  6 months of weekly IRT/Individual Psychotherapy followed by 12-18 months of biweekly or monthly IRT  2-4x per month Supported Education and Employment Services for 6 months  2-4x per month Family Education and Support Services for 6 months    10. Participants in therapy plan:   Bob Das    NAVIGATE Team:   NAVIGATE Individual Resiliency  and Family Clinician - aNncy Rubin AM, [facility]   Dr. Haylee Goodwin, RNCC  NAVIGATE SEE - YASHIRA Esparza  NAVIGATE Director and Family Clinician - ERENDIRA Bonilla, [facility]    Treatment plan was discussed virtually to limit patient exposure to COVID-19. Patient verbally agreed to treatment plan as documented. No mechanism in place for electronic signature of client at this time.     Regulatory Guidelines for Updating Treatment Plan  [place] Medical Assistance: Reviewed & signed at least every 90 days  Medicare:  Update per policy

## 2022-06-22 NOTE — PROGRESS NOTES
[facility] NAVIGATE Program Treatment Plan Summary  A Part of the [facility] First Episode of Psychosis Program     NAVIGATE Enrollee: Bob Das  /Age:  2000 (19 year old)  MRN: [id number]    Date of Initial Services:  19  Date of INTIAL Treatment Plan: 3/5/19  Last Review/Update Date:  22  Today's Date: 22  Next 90-Day Review Due: 22    The following represents a reviewed treatment plan.    1. 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 Current symptoms and circumstances that substantiate the diagnosis    Bob has a history of psychosis (paranoia, delusions, thought broadcasting, thought insertion, delusions of control, ideas of reference, odd beliefs per family/friends, auditory hallucinations, command auditory hallucinations, visual hallucinations, tactile hallucinations and negative symptoms (diminished emotional expression)), anxiety and SI. He presents with current symptoms of psychosis (paranoia, ideas of reference, auditory hallucinations, visual hallucinations, tactile hallucinations and negative symptoms (diminished emotional expression)) and anxiety. At times currently experiences passive SI and depressed mood.    3. How symptoms and/or behaviors are affecting level of function    Bob s systems are impacting functioning with respect to social relationships, familial relationships, employment and academics.    4. Risk Assessment:  Suicide:  Assessed Level of Immediate Risk: Medium  Ideation: No  Plan:  No  Means: No  Intent: No    Homicide/Violence:  Assessed Level of Immediate Risk: Low  Ideation: No  Plan: No  Means: No  Intent: No    5. Medications    No current outpatient medications on file.     No current facility-administered medications for this visit.     ** Not taking medications as of 2021.   6. Strengths     Supportive friends, family, recovery environment  Caution, Prudence, &  Discretion    Curiosity    Forgiveness & Mercy  Honest, Authentic, Genuine    Humor & Playfulness   Hope & Optimism      Industry, diligence, & perseverance    Kind & Generous    Self-control & Self-regulation      7. Barriers & Suicide Risk Factors     Command Hallucinations  Communication, limited to no communication with family/support network  Depression and/or Hopelessness  Environmental Stress (e.g. family conflict or criticism)  Male  SI  SIB  Symptoms of psychosis, positive (delusions and/or hallucinations)  Symptoms of psychosis, negative (flat affect, avolition, anhedonia, alogia, and/or apathy)  Symptoms of psychosis, cognitive (memory, attention and concentration, and/or executive functioning difficulties)  Treatment Non-Adherence     8. Treatment Domains and Goals    Domain 1: Illness Management & Recovery  Identify and engage possible areas of improvement related to medication optimization, psychosis (paranoia, delusions, thought broadcasting, thought insertion, delusions of control, ideas of reference, odd beliefs per family/friends, auditory hallucinations, command auditory hallucinations, visual hallucinations, disorganized speech, disorganized behavior and negative symptoms (diminished emotional expression, avolition, anhedonia, alogia and apathy)), anxiety, SI and SIB and ability to management illness.     Measurable Objectives Interventions Target Dates & Discharge Criteria   Medication Objectives    -Paricipate in a medication evaluation   -Take medications as prescribed and have reduced frequency and severity of symptoms  -Learn and implement strategies for overcoming barriers to taking medication  -Support system assists with overcoming barriers to taking medications     In [place]'s words:  -no specific goals, per Bob 10/8/21 (no changes 1/21/22)  -Bob is potentially open to meeting with Dr Patterson (10/8/21 - completed 11/4/21)   Medication Management  -Prescribe and monitor  medications  -Monitor and treat side effects  -Psychoeducation  -Behavioral activation  -Initial and routine lab work    IRT/Psychotherapy  -Psychoeducation  -Motivation interviewing  -CBT  -Behavioral activation    Family Therapy  -Psychoeducation  -Motivational interviewing  -Behavioral family therapy  -CBT  -Behavioral activation    Case Management  -NA or None   Target date:   6 months from 4/08/22    Discharge criteria:  Marked and sustained symptom improvement     Gains Made:  -Paricipate in a medication evaluation   -Take medications as prescribed and have reduced frequency and severity of symptoms  -Learn and implement strategies for overcoming barriers to taking medication  -Support system assists with overcoming barriers to taking medications  -was open to change in medication per prescriber recommendations  -Bob met with Dr Patterson on 11/4/21   Individual s Objectives    -Complete a safety plan with therapist and share with support system  -Define what recovery means to self  -Identify psychosocial areas of need  -Identify top 5 strengths and use those strengths when working toward goal achievement; simultaneously choose one area for improvement and identify two actionable steps toward improvement  -Create a goal plan consisting of one long-term goal, three short-term goals, and actionable steps toward short-term goal achievement  -Demonstrate understanding of psychosis (paranoia, delusions, thought broadcasting, thought insertion, delusions of control, ideas of reference, odd beliefs per family/friends, auditory hallucinations, command auditory hallucinations, visual hallucinations and negative symptoms (diminished emotional expression)), depression (difficulties with sleep, low energy and worthlessness and/or guilt), anxiety and SI in the context of self with respect to symptoms, causes, course, medications and the impact of stress  -Learn at least 2 coping strategies to successfully target current  symptoms  -Demonstrate understanding for how substance use impacts symptoms, identify stage of change, and experiment with reduced use or abstinence from all illicit substances   -Learn strategies to build positive emotions and facilitate resiliency   -Build client build resiliency through the skills of gratitude, savoring, active/constructive communication, and practicing acts of kindness.  -Develop and implement a relapse prevention plan including identification of warning signs, triggers, coping mechanisms, and how other persons can be supportive if symptoms increase or reemerge   -Process the psychotic episode by demonstrating understanding of how the episode impacted self, identifying positive coping strategies and resiliency used during that time, challenging self-stigmatizing beliefs, and developing a positive attitude towards facing future life challenges  -Process past trauma by demonstrating understanding of how the traumatic event impacted self, identifying positive coping strategies and resiliency used during that time, challenging self-stigmatizing beliefs, and developing a positive attitude towards facing future life challenges  -Identify primary styles of thinking, and demonstrate understanding of and use cognitive restructuring to successfully deal with negative feelings  -Identify persistent symptoms that interfere with activities and/or enjoyment and successfully implement two coping strategies to reduce symptoms severity  -Cooperate with the recommendations or requirements mandated by the criminal justice system     In Bob's words:  -continue striving for stability with regards to symptoms (as of  10/8/21, continue 1/21/22)  -continue to try whatever I can to help with symptom improvement (10/8/21, continue 1/21/22)     Medication Management  -Prescribe and monitor medications  -Monitor and treat side effects  -Psychoeducation  -Behavioral activation  -Initial and routine lab  work    IRT/Psychotherapy  -Psychoeducation  -Motivation interviewing  -CBT  -Behavioral activation  -Family involvement during portions of sessions    Family Therapy  -Psychoeducation  -Motivational interviewing  -Behavioral family therapy  -CBT  -Behavioral activation  -Client involvement during all or portions of sessions    Case Management  -NA or None   Target date:   12 months from 4/08/22    Discharge criteria:  Marked and sustained symptom improvement     [place] demonstrates understanding of mental illness     Bob successfully implements strategies to cope with stressors and/or symptoms to mitigate risk for increase in symptom severity or relapse    Gains Made:  -Complete a safety plan with therapist and share with support system  -Define what recovery means to self  -Identify psychosocial areas of need  -Identify top 5 strengths and use those strengths when working toward goal achievement; simultaneously choose one area for improvement and identify two actionable steps toward improvement  -Create a goal plan consisting of one long-term goal, three short-term goals, and actionable steps toward short-term goal achievement  -Demonstrate understanding of psychosis (auditory hallucinations, visual hallucinations and tactile hallucinations) and anxiety in the context of self with respect to symptoms, causes, course, medications and the impact of stress  -Learn at least 2 coping strategies to successfully target current symptoms  -Learn strategies to build positive emotions and facilitate resiliency   -Identify primary styles of thinking, and demonstrate understanding of and use cognitive restructuring to successfully deal with negative feelings  -Identify persistent symptoms that interfere with activities and/or enjoyment and successfully implement two coping strategies to reduce symptoms severity  -Bob has continued to be open to therapy  -Bob has increased coping related to symptoms as of 1/21/22     Support  System Objectives    -Supports increase the safety of the home by removing firearms or other lethal weapons from the client's easy access   -Supports agree to provide supervision and monitor suicidal potential   -Supports, including family members, terminate any hostile, critical responses to the client and increase their statements of praise, optimism, and affirmation   -Supports, including family members, verbalize realistic expectations and discipline methods   -Supports, including family members, verbally reinforce the client's active attempts to build self-esteem and rapport   -Supports verbalize increased understanding of an knowledge about the client's illness and treatment   -Identify psychosocial areas of need  -Verbalize understanding of the client's long-term and short-term goals  -Demonstrate understanding of psychosis (paranoia, delusions, thought broadcasting, thought insertion, delusions of control, ideas of reference, odd beliefs per family/friends, auditory hallucinations, command auditory hallucinations and visual hallucinations) and SI in the context of the client with respect to symptoms, causes, course, medications and the impact of stress  -Learn the client's signs of stress and possible coping skills  -Demonstrate understanding for how substance use impacts symptoms and how to support decrease in or abstinence from illicit substance use  -Learn skills that strengthen and support the client's positive behavior change  -Learn strategies to build positive emotions and facilitate resiliency including use of a resiliency story  -Develop and implement a relapse prevention plan including identification of warning signs, triggers, coping mechanisms, and how other persons can be supportive if symptoms increase or reemerge   -Learn and implement communication skills to enhance communication and respect among family members  -Learn and implement problem-solving and/or conflict resolution skills to manage  familial, personal and interpersonal problems constructively   Medication Management  -Prescribe and monitor medications  -Monitor and treat side effects  -Psychoeducation  -Behavioral activation  -Initial and routine lab work    IRT/Psychotherapy  -Psychoeducation  -Motivation interviewing  -CBT  -Behavioral activation  -Family involvement during portions of sessions    Family Therapy  -Psychoeducation  -Motivational interviewing  -Behavioral family therapy  -CBT  -Behavioral activation  -Client involvement during all or portions of sessions    Case Management  -NA or None   Target date:   6 months from 4/08/22    Discharge criteria:  Support system demonstrates understanding of mental illness     Support system successfully implements strategies to assist Bob cope with stressors and/or symptoms to mitigate risk for increase in symptom severity or relapse     Gains Made:  -Supports increase the safety of the home by removing firearms or other lethal weapons from the client's easy access   -Supports agree to provide supervision and monitor suicidal potential   -Supports, including family members, verbalize realistic expectations and discipline methods   -Supports, including family members, verbally reinforce the client's active attempts to build self-esteem and rapport   -Supports verbalize increased understanding of an knowledge about the client's illness and treatment   -Demonstrate understanding of psychosis (paranoia, delusions, auditory hallucinations, visual hallucinations and tactile hallucinations), SI and low self-esteem in the context of the client with respect to symptoms, causes, course, medications and the impact of stress  -Learn skills that strengthen and support the client's positive behavior change  -Learn and implement communication skills to enhance communication and respect among family members       Domain 2: Health & Basic Living Needs  Identify and engage possible areas of improvement related  to basic needs being met and maintaining or improving overall health and well-being     Measurable Objectives Interventions Discharge Criteria   -Verbalize an accurate understanding of factors influencing eating, health, and weight  -Learn and implement at least healthy nutritional practices  -Track and chart weight on a routine interval throughout therapy   -Learn and implement at least 2 skills to promote health sleep  -Establish and adhere to a plan to increase physical exercise    In Bob's words:  -take steps to approach independence (10/8/21, keep 1/21/22)  -leave the house and go out and about more (as of 1/21/22)   Medication Management  -Prescribe and monitor medications  -Monitor and treat side effects  -Psychoeducation  -Behavioral activation  -Initial and routine lab work    IRT/Psychotherapy  -Psychoeducation  -Motivation interviewing  -CBT  -Behavioral activation  -Family involvement during portions of sessions    Family Therapy  -Psychoeducation  -Motivational interviewing  -Behavioral family therapy  -CBT  -Behavioral activation  -Client involvement during all or portions of sessions    Supported Education & Employment  -Motivational interviewing  -Individualized placement and support   -Behavioral Activation  -Family involvement    Case Management  -NA or None   Target date:   12 months from 4/08/22    Discharge criteria:  Bob, his supports and treatment team report no unmet health and basic living needs    Gains Made:  -Verbalize an accurate understanding of factors influencing eating, health, and weight  -Learn and implement at least 2 skills to promote health sleep  -Independently arrange transportation to/from appointments   -Bob got his own bank account  -Bob has been open to exploring ways of effective communication in session (as of 12/23/20)  -keep a daily to do list and work on my art (has continued to work on art as of 10/8/21)     Domain 3: Family & Other Supports  Identify and engage  "possible areas of improvement related to engaging family, friends and other supports     Measurable Objectives Interventions Discharge Criteria   -Identify support system  -Invite support system to be involved in treatment  -Participate in family therapy  -Improve the quality of relationships by developing skills to better understand other people, communicate more effectively, manage disclosure, and understand social cues  -Increase communication with the parents, resulting in feeling attended to and understood  -Increase the frequency of positive interactions with parents  -Learn and implement problem-solving and/or conflict resolution skills to manage personal and interpersonal problems constructively  -Identify and implement two approaches to how strengths and interests can be used to initiate social contacts and develop peer friendships  -Learn and implement calming and coping strategies to manage anxiety symptoms and focus attention usefully during moments of social anxiety    -Strengthen the social support network with friends by initiating social contact and participating in social activities with peers   -Increase participation in interpersonal or peer group activities   -Renew two old fun activities or develop two new fun activity     In [place]'s words:  -\"coexist with Mom\" (as of 10/8/21, keep 1/21/22)  -stay connected with friend abroad and online community (10/8/21, keep 1/21/22)   Medication Management  -Prescribe and monitor medications  -Monitor and treat side effects  -Psychoeducation  -Behavioral activation  -Initial and routine lab work    IRT/Psychotherapy  -Psychoeducation  -Motivation interviewing  -CBT  -Behavioral activation  -Family involvement during portions of sessions    Family Therapy  -Psychoeducation  -Motivational interviewing  -Behavioral family therapy  -CBT  -Behavioral activation  -Client involvement during all or portions of sessions    Supported Education & " Employment  -Motivational interviewing  -Individualized placement and support   -Behavioral Activation  -Family involvement    Case Management  -NA or None   Target date:   12 months from 4/08/22    Discharge criteria:  Bob and his support system report feeling equipped with the necessary skills to communicate and problem solve during times of disagreement    Conflict with supports and peers are resolved constructively and consistently over time; 6 months    Gains Made:  -Identify support system  -Invite support system to be involved in treatment  -Improve the quality of relationships by developing skills to better understand other people, communicate more effectively, manage disclosure, and understand social cues  -Increase the frequency of positive interactions with parents  -Learn and implement problem-solving and/or conflict resolution skills to manage personal and interpersonal problems constructively  -Increase participation in interpersonal or peer group activities   -Renew two old fun activities or develop two new fun activity  -Bob has been processing more related to Mom in sessions  -Bob has explored ways to manage conflict and tension with Mom in sessions  -Bob has been sharing more in sessions with writer  -has been connecting with a friend and online community (as of 10/8/21)     Domain 4: Academic and Employment  Identify and engage possible areas of improvement relates to education and employment     Measurable Objectives Interventions Discharge Criteria   -Stay current with schoolwork, completing assignments and interacting appropriately with peers and teachers   -Utilize accommodations, effective study and test-taking skills on a regular basis to improve academic performance  -Increase participate in school-related activities   -Obtain/maintain gainful employment   -Supports offer assistance in developing and utilize an organized system to keep track of the client's work schedules, school  "assignments, chores, and/or household responsibilities  -Family members provide praise, encouragement for the client's attempts and successes at school/work     In [place]'s words:  -start a web comic related to mental health or psychosis (as of 1/21/22)  -Move towards independence (keep as of 1/21/22)    Previous, no longer relevant:  -\"keep job at [employer]\"  -\"explore [employer] schools in the area with [employer]\" on hold as of 9/23/20  -\"look for [employer]\" in process 9/23/20  -Bob is looking into other job opportunities right now as of 9/23/20  -Move towards independence Medication Management  -Prescribe and monitor medications  -Monitor and treat side effects  -Psychoeducation  -Behavioral activation  -Initial and routine lab work    IRT/Psychotherapy  -Psychoeducation  -Motivation interviewing  -CBT  -Behavioral activation  -Family involvement during portions of sessions    Family Therapy  -Psychoeducation  -Motivational interviewing  -Behavioral family therapy  -CBT  -Behavioral activation  -Client involvement during all or portions of sessions    Supported Education & Employment  -Motivational interviewing  -Individualized placement and support   -Behavioral Activation  -Family involvement    Case Management  -NA or None   Target date:   12 months from 4/08/22    Discharge criteria:  Work and school goals are achieved and maintained without follow along NAVIGATE Supported Education and Employment supports for 6 months    Gains Made:  -Explore areas of interest for continued educational opportunities   -Stay current with schoolwork, completing assignments and interacting appropriately with peers and teachers   -Utilize accommodations, effective study and test-taking skills on a regular basis to improve academic performance  -Obtain/maintain gainful employment   -Supports offer assistance in developing and utilize an organized system to keep track of the client's work schedules, school assignments, chores, and/or household " responsibilities  -Family members provide praise, encouragement for the client's attempts and successes at school/work   -Bob has explored some [employer] schools in the area  -Bob did use supports while at school  -Bob did graduate from High School       9. Frequency of sessions and expected duration of treatment:   1-4x per month Medication Management with Prescriber ongoing  6 months of weekly IRT/Individual Psychotherapy followed by 12-18 months of biweekly or monthly IRT  2-4x per month Supported Education and Employment Services for 6 months  2-4x per month Family Education and Support Services for 6 months    10. Participants in therapy plan:   Bob Das    NAVIGATE Team:   NAVIGATE Individual Resiliency  and Family Clinician - Nancy Rubin AM, [facility]   Dr. Haylee Goodwin, RNCC  NAVIGATE SEE - YASHIRA Esparza  NAVIGATE Director and Family Clinician - ERENDIRA Bonilla, [facility]    Treatment plan was discussed virtually to limit patient exposure to COVID-19. Patient verbally agreed to treatment plan as documented. No mechanism in place for electronic signature of client at this time.     Regulatory Guidelines for Updating Treatment Plan  [place] Medical Assistance: Reviewed & signed at least every 90 days  Medicare:  Update per policy

## 2022-06-22 NOTE — PROGRESS NOTES
[facility] NAVIGATE Program Treatment Plan Summary  A Part of the [facility] First Episode of Psychosis Program     NAVIGATE Enrollee: Bob Das  /Age:  2000 (19 year old)  MRN: [id number]    Date of Initial Services:  19  Date of INTIAL Treatment Plan: 3/5/19  Last Review/Update Date:  21  Today's Date: 10/8/21  Next 90-Day Review Due: 22    The following represents an updated and reviewed treatment plan.    1. 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 Current symptoms and circumstances that substantiate the diagnosis    Bob has a history of psychosis (paranoia, delusions, thought broadcasting, thought insertion, delusions of control, ideas of reference, odd beliefs per family/friends, auditory hallucinations, command auditory hallucinations, visual hallucinations, tactile hallucinations and negative symptoms (diminished emotional expression)), anxiety and SI. He presents with current symptoms of psychosis (paranoia, ideas of reference, auditory hallucinations, visual hallucinations, tactile hallucinations and negative symptoms (diminished emotional expression)), depression (low mood nearly every day, worthlessness and/or guilt and difficulty concentrating, thinking or making decisions) and anxiety. At times currently experiences passive SI and depressed mood.    3. How symptoms and/or behaviors are affecting level of function    Bob s systems are impacting functioning with respect to social relationships, familial relationships, employment and academics.    4. Risk Assessment:  Suicide:  Assessed Level of Immediate Risk: Medium  Ideation: No  Plan:  No  Means: No  Intent: No    Homicide/Violence:  Assessed Level of Immediate Risk: Low  Ideation: No  Plan: No  Means: No  Intent: No    5. Medications    No current outpatient medications on file.     No current facility-administered medications for this visit.      ** Not taking medications as of February 2021.   6. Strengths     Supportive friends, family, recovery environment  Caution, Prudence, & Discretion    Curiosity    Forgiveness & Mercy  Honest, Authentic, Genuine    Humor & Playfulness   Hope & Optimism      Industry, diligence, & perseverance    Kind & Generous    Self-control & Self-regulation      7. Barriers & Suicide Risk Factors     Command Hallucinations  Communication, limited to no communication with family/support network  Depression and/or Hopelessness  Environmental Stress (e.g. family conflict or criticism)  Male  SI  SIB  Symptoms of psychosis, positive (delusions and/or hallucinations)  Symptoms of psychosis, negative (flat affect, avolition, anhedonia, alogia, and/or apathy)  Symptoms of psychosis, cognitive (memory, attention and concentration, and/or executive functioning difficulties)  Treatment Non-Adherence     8. Treatment Domains and Goals    Domain 1: Illness Management & Recovery  Identify and engage possible areas of improvement related to medication optimization, psychosis (paranoia, delusions, thought broadcasting, thought insertion, delusions of control, ideas of reference, odd beliefs per family/friends, auditory hallucinations, command auditory hallucinations, visual hallucinations, disorganized speech, disorganized behavior and negative symptoms (diminished emotional expression, avolition, anhedonia, alogia and apathy)), anxiety, SI and SIB and ability to management illness.     Measurable Objectives Interventions Target Dates & Discharge Criteria   Medication Objectives    -Paricipate in a medication evaluation   -Take medications as prescribed and have reduced frequency and severity of symptoms  -Learn and implement strategies for overcoming barriers to taking medication  -Support system assists with overcoming barriers to taking medications     In Bob's words:  -no specific goals, per Bob 10/8/21  -Bob is potentially open to  meeting with Dr Patterson (10/8/21)   Medication Management  -Prescribe and monitor medications  -Monitor and treat side effects  -Psychoeducation  -Behavioral activation  -Initial and routine lab work    IRT/Psychotherapy  -Psychoeducation  -Motivation interviewing  -CBT  -Behavioral activation    Family Therapy  -Psychoeducation  -Motivational interviewing  -Behavioral family therapy  -CBT  -Behavioral activation    Case Management  -NA or None   Target date:   6 months from 10/08/21    Discharge criteria:  Marked and sustained symptom improvement     Gains Made:  -Paricipate in a medication evaluation   -Take medications as prescribed and have reduced frequency and severity of symptoms  -Learn and implement strategies for overcoming barriers to taking medication  -Support system assists with overcoming barriers to taking medications  -was open to change in medication per prescriber recommendations   Individual s Objectives    -Complete a safety plan with therapist and share with support system  -Define what recovery means to self  -Identify psychosocial areas of need  -Identify top 5 strengths and use those strengths when working toward goal achievement; simultaneously choose one area for improvement and identify two actionable steps toward improvement  -Create a goal plan consisting of one long-term goal, three short-term goals, and actionable steps toward short-term goal achievement  -Demonstrate understanding of psychosis (paranoia, delusions, thought broadcasting, thought insertion, delusions of control, ideas of reference, odd beliefs per family/friends, auditory hallucinations, command auditory hallucinations, visual hallucinations and negative symptoms (diminished emotional expression)), depression (difficulties with sleep, low energy and worthlessness and/or guilt), anxiety and SI in the context of self with respect to symptoms, causes, course, medications and the impact of stress  -Learn at least 2 coping  strategies to successfully target current symptoms  -Demonstrate understanding for how substance use impacts symptoms, identify stage of change, and experiment with reduced use or abstinence from all illicit substances   -Learn strategies to build positive emotions and facilitate resiliency   -Build client build resiliency through the skills of gratitude, savoring, active/constructive communication, and practicing acts of kindness.  -Develop and implement a relapse prevention plan including identification of warning signs, triggers, coping mechanisms, and how other persons can be supportive if symptoms increase or reemerge   -Process the psychotic episode by demonstrating understanding of how the episode impacted self, identifying positive coping strategies and resiliency used during that time, challenging self-stigmatizing beliefs, and developing a positive attitude towards facing future life challenges  -Process past trauma by demonstrating understanding of how the traumatic event impacted self, identifying positive coping strategies and resiliency used during that time, challenging self-stigmatizing beliefs, and developing a positive attitude towards facing future life challenges  -Identify primary styles of thinking, and demonstrate understanding of and use cognitive restructuring to successfully deal with negative feelings  -Identify persistent symptoms that interfere with activities and/or enjoyment and successfully implement two coping strategies to reduce symptoms severity  -Cooperate with the recommendations or requirements mandated by the criminal justice system     In Bob's words:  -continue striving for stability with regards to symptoms (as of  10/8/21)  -be open to medication recommendations  (continues to not be interested in meds 10/8/21)  -continue to try whatever I can to help with symptom improvement (10/8/21)     Medication Management  -Prescribe and monitor medications  -Monitor and treat side  effects  -Psychoeducation  -Behavioral activation  -Initial and routine lab work    IRT/Psychotherapy  -Psychoeducation  -Motivation interviewing  -CBT  -Behavioral activation  -Family involvement during portions of sessions    Family Therapy  -Psychoeducation  -Motivational interviewing  -Behavioral family therapy  -CBT  -Behavioral activation  -Client involvement during all or portions of sessions    Case Management  -NA or None   Target date:   12 months from 10/08/21    Discharge criteria:  Marked and sustained symptom improvement     Bob demonstrates understanding of mental illness     Bob successfully implements strategies to cope with stressors and/or symptoms to mitigate risk for increase in symptom severity or relapse    Gains Made:  -Complete a safety plan with therapist and share with support system  -Define what recovery means to self  -Identify psychosocial areas of need  -Identify top 5 strengths and use those strengths when working toward goal achievement; simultaneously choose one area for improvement and identify two actionable steps toward improvement  -Create a goal plan consisting of one long-term goal, three short-term goals, and actionable steps toward short-term goal achievement  -Demonstrate understanding of psychosis (auditory hallucinations, visual hallucinations and tactile hallucinations) and anxiety in the context of self with respect to symptoms, causes, course, medications and the impact of stress  -Learn at least 2 coping strategies to successfully target current symptoms  -Learn strategies to build positive emotions and facilitate resiliency   -Identify primary styles of thinking, and demonstrate understanding of and use cognitive restructuring to successfully deal with negative feelings  -Identify persistent symptoms that interfere with activities and/or enjoyment and successfully implement two coping strategies to reduce symptoms severity  -[place] has continued to be open to  therapy  -Bob has increased coping related to symptoms     Support System Objectives    -Supports increase the safety of the home by removing firearms or other lethal weapons from the client's easy access   -Supports agree to provide supervision and monitor suicidal potential   -Supports, including family members, terminate any hostile, critical responses to the client and increase their statements of praise, optimism, and affirmation   -Supports, including family members, verbalize realistic expectations and discipline methods   -Supports, including family members, verbally reinforce the client's active attempts to build self-esteem and rapport   -Supports verbalize increased understanding of an knowledge about the client's illness and treatment   -Identify psychosocial areas of need  -Verbalize understanding of the client's long-term and short-term goals  -Demonstrate understanding of psychosis (paranoia, delusions, thought broadcasting, thought insertion, delusions of control, ideas of reference, odd beliefs per family/friends, auditory hallucinations, command auditory hallucinations and visual hallucinations) and SI in the context of the client with respect to symptoms, causes, course, medications and the impact of stress  -Learn the client's signs of stress and possible coping skills  -Demonstrate understanding for how substance use impacts symptoms and how to support decrease in or abstinence from illicit substance use  -Learn skills that strengthen and support the client's positive behavior change  -Learn strategies to build positive emotions and facilitate resiliency including use of a resiliency story  -Develop and implement a relapse prevention plan including identification of warning signs, triggers, coping mechanisms, and how other persons can be supportive if symptoms increase or reemerge   -Learn and implement communication skills to enhance communication and respect among family members  -Learn and  implement problem-solving and/or conflict resolution skills to manage familial, personal and interpersonal problems constructively   Medication Management  -Prescribe and monitor medications  -Monitor and treat side effects  -Psychoeducation  -Behavioral activation  -Initial and routine lab work    IRT/Psychotherapy  -Psychoeducation  -Motivation interviewing  -CBT  -Behavioral activation  -Family involvement during portions of sessions    Family Therapy  -Psychoeducation  -Motivational interviewing  -Behavioral family therapy  -CBT  -Behavioral activation  -Client involvement during all or portions of sessions    Case Management  -NA or None   Target date:   6 months from 10/08/21    Discharge criteria:  Support system demonstrates understanding of mental illness     Support system successfully implements strategies to assist [place] cope with stressors and/or symptoms to mitigate risk for increase in symptom severity or relapse     Gains Made:  -Supports increase the safety of the home by removing firearms or other lethal weapons from the client's easy access   -Supports agree to provide supervision and monitor suicidal potential   -Supports, including family members, verbalize realistic expectations and discipline methods   -Supports, including family members, verbally reinforce the client's active attempts to build self-esteem and rapport   -Supports verbalize increased understanding of an knowledge about the client's illness and treatment   -Demonstrate understanding of psychosis (paranoia, delusions, auditory hallucinations, visual hallucinations and tactile hallucinations), SI and low self-esteem in the context of the client with respect to symptoms, causes, course, medications and the impact of stress  -Learn skills that strengthen and support the client's positive behavior change  -Learn and implement communication skills to enhance communication and respect among family members       Domain 2: Health & Basic  Living Needs  Identify and engage possible areas of improvement related to basic needs being met and maintaining or improving overall health and well-being     Measurable Objectives Interventions Discharge Criteria   -Verbalize an accurate understanding of factors influencing eating, health, and weight  -Learn and implement at least healthy nutritional practices  -Track and chart weight on a routine interval throughout therapy   -Learn and implement at least 2 skills to promote health sleep  -Establish and adhere to a plan to increase physical exercise    In Bob's words:  -take steps to approach independence (10/8/21)   Medication Management  -Prescribe and monitor medications  -Monitor and treat side effects  -Psychoeducation  -Behavioral activation  -Initial and routine lab work    IRT/Psychotherapy  -Psychoeducation  -Motivation interviewing  -CBT  -Behavioral activation  -Family involvement during portions of sessions    Family Therapy  -Psychoeducation  -Motivational interviewing  -Behavioral family therapy  -CBT  -Behavioral activation  -Client involvement during all or portions of sessions    Supported Education & Employment  -Motivational interviewing  -Individualized placement and support   -Behavioral Activation  -Family involvement    Case Management  -NA or None   Target date:   12 months from 10/08/21    Discharge criteria:  Bob, his supports and treatment team report no unmet health and basic living needs    Gains Made:  -Verbalize an accurate understanding of factors influencing eating, health, and weight  -Learn and implement at least 2 skills to promote health sleep  -Independently arrange transportation to/from appointments   -Bob got his own bank account  -Bob has been open to exploring ways of effective communication in session (as of 12/23/20)  -keep a daily to do list and work on my art (has continued to work on art as of 10/8/21)     Domain 3: Family & Other Supports  Identify and engage  "possible areas of improvement related to engaging family, friends and other supports     Measurable Objectives Interventions Discharge Criteria   -Identify support system  -Invite support system to be involved in treatment  -Participate in family therapy  -Improve the quality of relationships by developing skills to better understand other people, communicate more effectively, manage disclosure, and understand social cues  -Increase communication with the parents, resulting in feeling attended to and understood  -Increase the frequency of positive interactions with parents  -Learn and implement problem-solving and/or conflict resolution skills to manage personal and interpersonal problems constructively  -Identify and implement two approaches to how strengths and interests can be used to initiate social contacts and develop peer friendships  -Learn and implement calming and coping strategies to manage anxiety symptoms and focus attention usefully during moments of social anxiety    -Strengthen the social support network with friends by initiating social contact and participating in social activities with peers   -Increase participation in interpersonal or peer group activities   -Renew two old fun activities or develop two new fun activity     In [place]'s words:  -\"coexist with Mom\" (as of 10/8/21)  -stay connected with friend abroad and online community (10/8/21)   Medication Management  -Prescribe and monitor medications  -Monitor and treat side effects  -Psychoeducation  -Behavioral activation  -Initial and routine lab work    IRT/Psychotherapy  -Psychoeducation  -Motivation interviewing  -CBT  -Behavioral activation  -Family involvement during portions of sessions    Family Therapy  -Psychoeducation  -Motivational interviewing  -Behavioral family therapy  -CBT  -Behavioral activation  -Client involvement during all or portions of sessions    Supported Education & Employment  -Motivational " interviewing  -Individualized placement and support   -Behavioral Activation  -Family involvement    Case Management  -NA or None   Target date:   12 months from 10/08/21    Discharge criteria:  Bob and his support system report feeling equipped with the necessary skills to communicate and problem solve during times of disagreement    Conflict with supports and peers are resolved constructively and consistently over time; 6 months    Gains Made:  -Identify support system  -Invite support system to be involved in treatment  -Improve the quality of relationships by developing skills to better understand other people, communicate more effectively, manage disclosure, and understand social cues  -Increase the frequency of positive interactions with parents  -Learn and implement problem-solving and/or conflict resolution skills to manage personal and interpersonal problems constructively  -Increase participation in interpersonal or peer group activities   -Renew two old fun activities or develop two new fun activity  -Bob has been processing more related to Mom in sessions  -Bob has explored ways to manage conflict and tension with Mom in sessions  -Bob has been sharing more in sessions with writer  -has been connecting with a friend and online community (as of 10/8/21)     Domain 4: Academic and Employment  Identify and engage possible areas of improvement relates to education and employment     Measurable Objectives Interventions Discharge Criteria   -Stay current with schoolwork, completing assignments and interacting appropriately with peers and teachers   -Utilize accommodations, effective study and test-taking skills on a regular basis to improve academic performance  -Increase participate in school-related activities   -Obtain/maintain gainful employment   -Supports offer assistance in developing and utilize an organized system to keep track of the client's work schedules, school assignments, chores, and/or  "household responsibilities  -Family members provide praise, encouragement for the client's attempts and successes at school/work     In Bob's words:  -\"not on my radar right now\" (10/8/21)  -interested in work and school in the future (10/8/21)    Previous, no longer relevant:  -\"keep job at [employer]\"  -\"explore tech schools in the area with [place]\" on hold as of 9/23/20  -\"look for [employer]\" in process 9/23/20  -Bob is looking into other job opportunities right now as of 9/23/20  -Move towards independence Medication Management  -Prescribe and monitor medications  -Monitor and treat side effects  -Psychoeducation  -Behavioral activation  -Initial and routine lab work    IRT/Psychotherapy  -Psychoeducation  -Motivation interviewing  -CBT  -Behavioral activation  -Family involvement during portions of sessions    Family Therapy  -Psychoeducation  -Motivational interviewing  -Behavioral family therapy  -CBT  -Behavioral activation  -Client involvement during all or portions of sessions    Supported Education & Employment  -Motivational interviewing  -Individualized placement and support   -Behavioral Activation  -Family involvement    Case Management  -NA or None   Target date:   12 months from 10/08/21    Discharge criteria:  Work and school goals are achieved and maintained without follow along NAVIGATE Supported Education and Employment supports for 6 months    Gains Made:  -Explore areas of interest for continued educational opportunities   -Stay current with schoolwork, completing assignments and interacting appropriately with peers and teachers   -Utilize accommodations, effective study and test-taking skills on a regular basis to improve academic performance  -Obtain/maintain gainful employment   -Supports offer assistance in developing and utilize an organized system to keep track of the client's work schedules, school assignments, chores, and/or household responsibilities  -Family members provide praise, " encouragement for the client's attempts and successes at school/work   -Bob has explored some [employer] schools in the area  -Bob did use supports while at school  -Bob did graduate from High School       9. Frequency of sessions and expected duration of treatment:   1-4x per month Medication Management with Prescriber ongoing  6 months of weekly IRT/Individual Psychotherapy followed by 12-18 months of biweekly or monthly IRT  2-4x per month Supported Education and Employment Services for 6 months  2-4x per month Family Education and Support Services for 6 months    10. Participants in therapy plan:   Bob Das    NAVIGATE Team:   NAVIGATE Individual Resiliency [place] and Family Clinician - Nancy Rubin AM, [facility]   Dr. Haylee Goodwin, RNCC  NAVIGATE SEE - YASHIRA Esparza  NAVIGATE Director and Family Clinician - EREDNIRA Bonilla, [facility]    Treatment plan was discussed virtually to limit patient exposure to COVID-19. Patient verbally agreed to treatment plan as documented. No mechanism in place for electronic signature of client at this time.     Regulatory Guidelines for Updating Treatment Plan  [place] Medical Assistance: Reviewed & signed at least every 90 days  Medicare:  Update per policy

## 2022-06-24 ENCOUNTER — OFFICE VISIT (OUTPATIENT)
Dept: PSYCHIATRY | Facility: CLINIC | Age: 22
End: 2022-06-24

## 2022-06-24 ENCOUNTER — DOCUMENTATION ONLY (OUTPATIENT)
Dept: PSYCHIATRY | Facility: CLINIC | Age: 22
End: 2022-06-24

## 2022-06-24 DIAGNOSIS — F29 PSYCHOSIS, UNSPECIFIED PSYCHOSIS TYPE (H): Primary | ICD-10-CM

## 2022-06-26 NOTE — PROGRESS NOTES
[facility] Clinician Contact & Progress Note  For Individual Resiliency Training (IRT)  A Part of the [facility] First Episode of Psychosis Program    NA[facility]ATE Enrollee: Bob Das (2000)     MRN: [id number]  Date:  6/24/22  Diagnosis: Psychosis, unspecified F29.0  Clinician: [facility] Individual Resiliency Trainer, MADELYN Scales     1. Type of contact: (majority of time spent)  IRT Session    2. People present:   Writer  Client: Bob Das  Mom for first 5 minutes    3. Length of Actual Contact: Start Time: 11:30am; End Time: 12:25pm   Traveled?    No     4. Location of contact:  [facility], [place]    5. Did the client complete the home practice option(s) from the previous session: Completed    6. Motivational Teaching Strategies:  Connect info and skills with personal goals  Promote hope and positive expectations  Explore pros and cons of change  Re-frame experiences in positive light    7. Educational Teaching Strategies:  Review of written material/education  Relate information to client's experience  Ask questions to check comprehension  Break down information into small chunks  Adopt client's language     8. CBT Teaching Strategies:  Reinforcement and shaping (positive feedback for steps towards goals and gains in knowledge & skills)  Social skills training (exploring barriers and experiences related to social skills)  Coping skills training (review current coping skills and plan home practice)    9. IRT Module(s) Addressed:  Module 7 - Building a Bridging to Your Goals  Reflecting on growth and progress     10. Techniques utilized:   Hay announced at beginning of session  Review of goal  Review of previous meeting  Present new material  Problem-solving practice  Help client choose a home practice option  Summarize progress made in current session    11. Mental Status Exam:    Alertness: alert  and oriented  Appearance: well groomed  Behavior/Demeanor: cooperative, pleasant and  calm, with fair  eye contact   Speech: regular rate and rhythm  Language: intact and no obvious problem. Preferred language identified as English.  Psychomotor: normal or unremarkable  Mood: description consistent with euthymia  Affect: full range and appropriate; was congruent to mood; was congruent to content  Thought Process/Associations: unremarkable  Thought Content:  Reports paranoid ideation;  Denies suicidal ideation and violent ideation  Perception:  Reports auditory hallucinations and visual hallucinations;  Denies none  Insight: adequate  Judgment: good  Cognition: does  appear grossly intact; formal cognitive testing was not done  Suicidal ideation: denies SI, denies intent,  and denies plan  Homicidal Ideation: denies    12. Assessment/Progress Note:     Met with Bob on this date for last IRT session. Mom joined for first 10 minutes. Checked in about next steps including step down clinic with [facility] and also exchanged well wishes. Writer thanked Mom and Bob for allowing me to be a part of his therapeutic care over the years. Spent time in session with Bob reflecting back on all of his significant growth and progress. Bob commented how now psychosis symptoms are not as distressing or scary to him, but rather he relates to them in a way that brings meaning and enriches his life. Also reflected on the ways Bob approaches relationships and life in different ways and Bob expressed gratitude for the ways he has noticed himself growing and evolving as a person. Writer shared about self-actualizing tendency and named writer's observation of this in Bob over the past 3.5 years and again thanked Bob for allowing writer to be a part of his process. Confirmed plans for Bob to participate in Step Down Clinic and discussed clinic calling Mom to schedule Bob for next therapy session. Overall, Bob was open and engaged throughout the session. Symptom assessment, safety assessment, discussion and  "identification of coping strategies, and exploration of material in IRT modules was all in support of Bob's self-identified goal(s) as identified in most recent [facility] Treatment Plan. Progress toward goal completion seems fair.    13. Plan/Referrals:     [place] will transition to Step Down Clinic. Will instruct clinic to call Mom to schedule. Bob is aware he can reach out to writer directly and/or [facility] team should concerns or needs arise prior to next scheduled appointment.     Billing for \"Interactive Complexity\"?    No      Nancy Rubin [facility]    [facility] Individual Resiliency Trainer  "

## 2022-07-13 ENCOUNTER — TELEPHONE (OUTPATIENT)
Dept: PSYCHIATRY | Facility: CLINIC | Age: 22
End: 2022-07-13

## 2022-07-13 NOTE — TELEPHONE ENCOUNTER
Outgoing Telephone Call/Email/Message  For Care Coordination  First Episode Psychosis Programs    Patient Name: Bob Das (2000)     MRN: [id number]  Type of Contact: phone  Date of Contact: 7/13/2022  Contacted/by/patient relation: mom, Paloma  Contact Length: LVM    Discussed:   Writer LVM with Paloma to schedule first visit for Step Down clinic per patient request on 6/24. Left contact information for scheduling.    Follow up needed:  KASSANDRA Sandoval

## 2022-08-22 NOTE — PROGRESS NOTES
[facility] Discharge  A Part of the [facility] First Episode of Psychosis Program     Patient Name: Bob Das  /Age:  2000 (22 year old)  Diagnosis(es):   Psychosis unspecified    Program Discharge Date:   22  Reason for Discharge:   Graduation to Navigate Step Down  Patient/Family informed of discharge in an appointment.     Ale Bell, [facility]   [facility] Director & Family Clinician

## 2022-08-23 ENCOUNTER — TELEPHONE (OUTPATIENT)
Dept: PSYCHIATRY | Facility: CLINIC | Age: 22
End: 2022-08-23

## 2022-09-08 ENCOUNTER — TELEPHONE (OUTPATIENT)
Dept: PSYCHIATRY | Facility: CLINIC | Age: 22
End: 2022-09-08

## 2022-09-18 ENCOUNTER — HEALTH MAINTENANCE LETTER (OUTPATIENT)
Age: 22
End: 2022-09-18

## 2023-01-09 NOTE — PROGRESS NOTES
[facility] NAVIGATE Program Treatment Plan Summary  A Part of the [facility] First Episode of Psychosis Program     NAVIGATE Enrollee: Bob Das  /Age:  2000 (19 year old)  MRN: [id number]    Date of Initial Services:  19  Date of INTIAL Treatment Plan: 3/5/19  Last Review/Update Date:  20  Today's Date: 20  Next 90-Day Review Due:  20    The following represents a reviewed and UPDATED and reviewed treatment plan.    1. 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 Current symptoms and circumstances that substantiate the diagnosis    Bob has a history of psychosis (paranoia, delusions, thought broadcasting, thought insertion, delusions of control, ideas of reference, odd beliefs per family/friends, auditory hallucinations, command auditory hallucinations, visual hallucinations, tactile hallucinations and negative symptoms (diminished emotional expression)), anxiety and SI. He presents with current symptoms of psychosis (paranoia, ideas of reference, auditory hallucinations, visual hallucinations, tactile hallucinations and negative symptoms (diminished emotional expression, avolition and anhedonia)) and anxiety.     3. How symptoms and/or behaviors are affecting level of function    Bob s systems are impacting functioning with respect to social relationships, familial relationships, employment and academics.    4. Risk Assessment:  Suicide:  Assessed Level of Immediate Risk: Medium  Ideation: No  Plan:  No  Means: No  Intent: No    Homicide/Violence:  Assessed Level of Immediate Risk: Low  Ideation: No  Plan: No  Means: No  Intent: No    5. Medications    Current Outpatient Medications   Medication     FLUoxetine (PROZAC) 10 MG capsule     propranolol (INDERAL) 10 MG tablet     risperiDONE (RISPERDAL) 3 MG tablet     No current facility-administered medications for this visit.        6. Strengths      Medication adherence  Supportive friends, family, recovery environment  Caution, Prudence, & Discretion    Curiosity    Forgiveness & Mercy  Honest, Authentic, Genuine    Humor & Playfulness   Hope & Optimism      Industry, diligence, & perseverance    Kind & Generous    Self-control & Self-regulation      7. Barriers & Suicide Risk Factors     Command Hallucinations  Communication, limited to no communication with family/support network  Male  SI  SIB  Symptoms of psychosis, positive (delusions and/or hallucinations)  Symptoms of psychosis, negative (flat affect, avolition, anhedonia, alogia, and/or apathy)  Symptoms of psychosis, cognitive (memory, attention and concentration, and/or executive functioning difficulties)    8. Treatment Domains and Goals    Domain 1: Illness Management & Recovery  Identify and engage possible areas of improvement related to medication optimization, psychosis (paranoia, delusions, thought broadcasting, thought insertion, delusions of control, ideas of reference, odd beliefs per family/friends, auditory hallucinations, command auditory hallucinations, visual hallucinations, disorganized speech, disorganized behavior and negative symptoms (diminished emotional expression, avolition, anhedonia, alogia and apathy)), anxiety, SI and SIB and ability to management illness.     Measurable Objectives Interventions Target Dates & Discharge Criteria   Medication Objectives    -Paricipate in a medication evaluation   -Take medications as prescribed and have reduced frequency and severity of symptoms  -Learn and implement strategies for overcoming barriers to taking medication  -Support system assists with overcoming barriers to taking medications   Medication Management  -Prescribe and monitor medications  -Monitor and treat side effects  -Psychoeducation  -Behavioral activation  -Initial and routine lab work    IRT/Psychotherapy  -Psychoeducation  -Motivation interviewing  -CBT  -Behavioral  activation    Family Therapy  -Psychoeducation  -Motivational interviewing  -Behavioral family therapy  -CBT  -Behavioral activation    Case Management  -NA or None   Target date:   6 months from 9/23/20    Discharge criteria:  Marked and sustained symptom improvement     Gains Made:  -Paricipate in a medication evaluation   -Take medications as prescribed and have reduced frequency and severity of symptoms  -Learn and implement strategies for overcoming barriers to taking medication  -Support system assists with overcoming barriers to taking medications  -was open to change in medication per prescriber recommendations   Individual s Objectives    -Complete a safety plan with therapist and share with support system  -Define what recovery means to self  -Identify psychosocial areas of need  -Identify top 5 strengths and use those strengths when working toward goal achievement; simultaneously choose one area for improvement and identify two actionable steps toward improvement  -Create a goal plan consisting of one long-term goal, three short-term goals, and actionable steps toward short-term goal achievement  -Demonstrate understanding of psychosis (paranoia, delusions, thought broadcasting, thought insertion, delusions of control, ideas of reference, odd beliefs per family/friends, auditory hallucinations, command auditory hallucinations, visual hallucinations and negative symptoms (diminished emotional expression)), depression (difficulties with sleep, low energy and worthlessness and/or guilt), anxiety and SI in the context of self with respect to symptoms, causes, course, medications and the impact of stress  -Learn at least 2 coping strategies to successfully target current symptoms  -Demonstrate understanding for how substance use impacts symptoms, identify stage of change, and experiment with reduced use or abstinence from all illicit substances   -Learn strategies to build positive emotions and facilitate  "resiliency   -Build client build resiliency through the skills of gratitude, savoring, active/constructive communication, and practicing acts of kindness.  -Develop and implement a relapse prevention plan including identification of warning signs, triggers, coping mechanisms, and how other persons can be supportive if symptoms increase or reemerge   -Process the psychotic episode by demonstrating understanding of how the episode impacted self, identifying positive coping strategies and resiliency used during that time, challenging self-stigmatizing beliefs, and developing a positive attitude towards facing future life challenges  -Process past trauma by demonstrating understanding of how the traumatic event impacted self, identifying positive coping strategies and resiliency used during that time, challenging self-stigmatizing beliefs, and developing a positive attitude towards facing future life challenges  -Identify primary styles of thinking, and demonstrate understanding of and use cognitive restructuring to successfully deal with negative feelings  -Identify persistent symptoms that interfere with activities and/or enjoyment and successfully implement two coping strategies to reduce symptoms severity  -Cooperate with the recommendations or requirements mandated by the criminal justice system     In Bob's words:  -\"Continue being open in therapy\"  -\"decrease AH and increase coping strategies related to AH\"  -\"find ways to decrease worry\"  -\"get confused less from symptoms\"   Medication Management  -Prescribe and monitor medications  -Monitor and treat side effects  -Psychoeducation  -Behavioral activation  -Initial and routine lab work    IRT/Psychotherapy  -Psychoeducation  -Motivation interviewing  -CBT  -Behavioral activation  -Family involvement during portions of sessions    Family Therapy  -Psychoeducation  -Motivational interviewing  -Behavioral family therapy  -CBT  -Behavioral activation  -Client " involvement during all or portions of sessions    Case Management  -NA or None   Target date:   12 months from 9/23/20    Discharge criteria:  Marked and sustained symptom improvement     Bob demonstrates understanding of mental illness     Bob successfully implements strategies to cope with stressors and/or symptoms to mitigate risk for increase in symptom severity or relapse    Gains Made:  -Complete a safety plan with therapist and share with support system  -Define what recovery means to self  -Identify psychosocial areas of need  -Identify top 5 strengths and use those strengths when working toward goal achievement; simultaneously choose one area for improvement and identify two actionable steps toward improvement  -Create a goal plan consisting of one long-term goal, three short-term goals, and actionable steps toward short-term goal achievement  -Demonstrate understanding of psychosis (auditory hallucinations, visual hallucinations and tactile hallucinations) and anxiety in the context of self with respect to symptoms, causes, course, medications and the impact of stress  -Learn at least 2 coping strategies to successfully target current symptoms  -Learn strategies to build positive emotions and facilitate resiliency   -Identify primary styles of thinking, and demonstrate understanding of and use cognitive restructuring to successfully deal with negative feelings  -Identify persistent symptoms that interfere with activities and/or enjoyment and successfully implement two coping strategies to reduce symptoms severity  -[place] has continued to be open to therapy  -[place] has increased coping related to symptoms     Support System Objectives    -Supports increase the safety of the home by removing firearms or other lethal weapons from the client's easy access   -Supports agree to provide supervision and monitor suicidal potential   -Supports, including family members, terminate any hostile, critical responses to  the client and increase their statements of praise, optimism, and affirmation   -Supports, including family members, verbalize realistic expectations and discipline methods   -Supports, including family members, verbally reinforce the client's active attempts to build self-esteem and rapport   -Supports verbalize increased understanding of an knowledge about the client's illness and treatment   -Identify psychosocial areas of need  -Verbalize understanding of the client's long-term and short-term goals  -Demonstrate understanding of psychosis (paranoia, delusions, thought broadcasting, thought insertion, delusions of control, ideas of reference, odd beliefs per family/friends, auditory hallucinations, command auditory hallucinations and visual hallucinations) and SI in the context of the client with respect to symptoms, causes, course, medications and the impact of stress  -Learn the client's signs of stress and possible coping skills  -Demonstrate understanding for how substance use impacts symptoms and how to support decrease in or abstinence from illicit substance use  -Learn skills that strengthen and support the client's positive behavior change  -Learn strategies to build positive emotions and facilitate resiliency including use of a resiliency story  -Develop and implement a relapse prevention plan including identification of warning signs, triggers, coping mechanisms, and how other persons can be supportive if symptoms increase or reemerge   -Learn and implement communication skills to enhance communication and respect among family members  -Learn and implement problem-solving and/or conflict resolution skills to manage familial, personal and interpersonal problems constructively   Medication Management  -Prescribe and monitor medications  -Monitor and treat side effects  -Psychoeducation  -Behavioral activation  -Initial and routine lab work    IRT/Psychotherapy  -Psychoeducation  -Motivation  interviewing  -CBT  -Behavioral activation  -Family involvement during portions of sessions    Family Therapy  -Psychoeducation  -Motivational interviewing  -Behavioral family therapy  -CBT  -Behavioral activation  -Client involvement during all or portions of sessions    Case Management  -NA or None   Target date:   6 months from 9/23/20    Discharge criteria:  Support system demonstrates understanding of mental illness     Support system successfully implements strategies to assist [place] cope with stressors and/or symptoms to mitigate risk for increase in symptom severity or relapse     Gains Made:  -Supports increase the safety of the home by removing firearms or other lethal weapons from the client's easy access   -Supports agree to provide supervision and monitor suicidal potential   -Supports, including family members, verbalize realistic expectations and discipline methods   -Supports, including family members, verbally reinforce the client's active attempts to build self-esteem and rapport   -Supports verbalize increased understanding of an knowledge about the client's illness and treatment   -Demonstrate understanding of psychosis (paranoia, delusions, auditory hallucinations, visual hallucinations and tactile hallucinations), SI and low self-esteem in the context of the client with respect to symptoms, causes, course, medications and the impact of stress  -Learn skills that strengthen and support the client's positive behavior change  -Learn and implement communication skills to enhance communication and respect among family members       Domain 2: Health & Basic Living Needs  Identify and engage possible areas of improvement related to basic needs being met and maintaining or improving overall health and well-being     Measurable Objectives Interventions Discharge Criteria   -Verbalize an accurate understanding of factors influencing eating, health, and weight  -Learn and implement at least healthy  "nutritional practices  -Track and chart weight on a routine interval throughout therapy   -Learn and implement at least 2 skills to promote health sleep  -Establish and adhere to a plan to increase physical exercise    In Bob's words:  -\"eventually want to move out, but not anytime soon\"  -\"practice assertive communication\"     Medication Management  -Prescribe and monitor medications  -Monitor and treat side effects  -Psychoeducation  -Behavioral activation  -Initial and routine lab work    IRT/Psychotherapy  -Psychoeducation  -Motivation interviewing  -CBT  -Behavioral activation  -Family involvement during portions of sessions    Family Therapy  -Psychoeducation  -Motivational interviewing  -Behavioral family therapy  -CBT  -Behavioral activation  -Client involvement during all or portions of sessions    Supported Education & Employment  -Motivational interviewing  -Individualized placement and support   -Behavioral Activation  -Family involvement    Case Management  -NA or None   Target date:   12 months from 9/23/20    Discharge criteria:  Bob, his supports and treatment team report no unmet health and basic living needs    Gains Made:  -Verbalize an accurate understanding of factors influencing eating, health, and weight  -Independently arrange transportation to/from appointments   -Bob got his own bank account     Domain 3: Family & Other Supports  Identify and engage possible areas of improvement related to engaging family, friends and other supports     Measurable Objectives Interventions Discharge Criteria   -Identify support system  -Invite support system to be involved in treatment  -Participate in family therapy  -Improve the quality of relationships by developing skills to better understand other people, communicate more effectively, manage disclosure, and understand social cues  -Increase communication with the parents, resulting in feeling attended to and understood  -Increase the frequency of " "positive interactions with parents  -Learn and implement problem-solving and/or conflict resolution skills to manage personal and interpersonal problems constructively  -Identify and implement two approaches to how strengths and interests can be used to initiate social contacts and develop peer friendships  -Learn and implement calming and coping strategies to manage anxiety symptoms and focus attention usefully during moments of social anxiety    -Strengthen the social support network with friends by initiating social contact and participating in social activities with peers   -Increase participation in interpersonal or peer group activities   -Renew two old fun activities or develop two new fun activity     In Bob's words:  -\"be able to approach Mom more\"  -\"improve relationship with Mom\"  -identify ways to stay on Mom's \"good side\" and keep peace in relationship   Medication Management  -Prescribe and monitor medications  -Monitor and treat side effects  -Psychoeducation  -Behavioral activation  -Initial and routine lab work    IRT/Psychotherapy  -Psychoeducation  -Motivation interviewing  -CBT  -Behavioral activation  -Family involvement during portions of sessions    Family Therapy  -Psychoeducation  -Motivational interviewing  -Behavioral family therapy  -CBT  -Behavioral activation  -Client involvement during all or portions of sessions    Supported Education & Employment  -Motivational interviewing  -Individualized placement and support   -Behavioral Activation  -Family involvement    Case Management  -NA or None   Target date:   12 months from 9/23/20    Discharge criteria:  Bob and his support system report feeling equipped with the necessary skills to communicate and problem solve during times of disagreement    Conflict with supports and peers are resolved constructively and consistently over time; 6 months    Gains Made:  -Identify support system  -Invite support system to be involved in " "treatment  -Improve the quality of relationships by developing skills to better understand other people, communicate more effectively, manage disclosure, and understand social cues  -Increase the frequency of positive interactions with parents  -Learn and implement problem-solving and/or conflict resolution skills to manage personal and interpersonal problems constructively  -Increase participation in interpersonal or peer group activities   -Renew two old fun activities or develop two new fun activity  -Bob has been processing more related to Mom in sessions     Domain 4: Academic and Employment  Identify and engage possible areas of improvement relates to education and employment     Measurable Objectives Interventions Discharge Criteria   -Stay current with schoolwork, completing assignments and interacting appropriately with peers and teachers   -Utilize accommodations, effective study and test-taking skills on a regular basis to improve academic performance  -Increase participate in school-related activities   -Obtain/maintain gainful employment   -Supports offer assistance in developing and utilize an organized system to keep track of the client's work schedules, school assignments, chores, and/or household responsibilities  -Family members provide praise, encouragement for the client's attempts and successes at school/work     In Bob's words:  -\"explore tech schools in the area with [employer]\" on hold as of 9/23/20  -\"look for [employer]\" in process 9/23/20  -Bob is looking into other job opportunities right now as of 9/23/20  -Move towards independence    Previous, no longer relevant:  -\"keep job at [email or web address]\" Medication Management  -Prescribe and monitor medications  -Monitor and treat side effects  -Psychoeducation  -Behavioral activation  -Initial and routine lab work    IRT/Psychotherapy  -Psychoeducation  -Motivation interviewing  -CBT  -Behavioral activation  -Family involvement during portions of " sessions    Family Therapy  -Psychoeducation  -Motivational interviewing  -Behavioral family therapy  -CBT  -Behavioral activation  -Client involvement during all or portions of sessions    Supported Education & Employment  -Motivational interviewing  -Individualized placement and support   -Behavioral Activation  -Family involvement    Case Management  -NA or None   Target date:   12 months from 9/23/20    Discharge criteria:  Work and school goals are achieved and maintained without follow along NAVIGATE Supported Education and Employment supports for 6 months    Gains Made:  -Explore areas of interest for continued educational opportunities   -Stay current with schoolwork, completing assignments and interacting appropriately with peers and teachers   -Utilize accommodations, effective study and test-taking skills on a regular basis to improve academic performance  -Obtain/maintain gainful employment   -Supports offer assistance in developing and utilize an organized system to keep track of the client's work schedules, school assignments, chores, and/or household responsibilities  -Family members provide praise, encouragement for the client's attempts and successes at school/work   -Bob has explored some [employer] in the area  -Bob did use supports while at school  -Bob did graduate from High School       9. Frequency of sessions and expected duration of treatment:   1-4x per month Medication Management with Prescriber ongoing  6 months of weekly IRT/Individual Psychotherapy followed by 12-18 months of biweekly or monthly IRT  2-4x per month Supported Education and Employment Services for 6 months  2-4x per month Family Education and Support Services for 6 months    10. Participants in therapy plan:   Bob [facility] Team:   [facility] Individual Resiliency [place] and Family Clinician - Nancy Rubin AM, SW   CRISTHIAN Wilcox, RNCC  [facility] SEE - YASHIRA Esparza  Director and Family Clinician - ERENDIRA Bonilla, [facility]    Treatment plan was discussed virtually to limit patient exposure to COVID-19. Patient verbally agreed to treatment plan as documented. No mechanism in place for electronic signature at this time. Provider signed the treatment plan signature form and will send to scanning when back in clinic.     Regulatory Guidelines for Updating Treatment Plan  [place] Medical Assistance: Reviewed & signed at least every 90 days  Medicare:  Update per policy   EMT/paramedic

## 2023-03-20 NOTE — PROGRESS NOTES
[facility] Clinician Contact & Progress Note   For Family Education Program    NAVIGATE Enrollee: Bob Das (2000)     MRN: [id number]  Date:  4/01/20  Diagnosis(es):   Schizophrenia  Clinician: [facility], MADELYN Mcintyre     1. Type of contact: (majority of time spent)  Family Session via telehealth  Mode of communication: [employer] (HIPAA compliant, secure platform) for first 30 min then reverted to a phone call due to issues with microphone. Family consented verbally to this mode of therapy today.  Reason for telehealth: COVID-19. This patient visit was converted to a telehealth visit to minimize exposure to COVID-19.    2. People present:   Writer  Client: No  Significant Other/Family/Friend:  Mother    3. Length of Actual Contact: Start Time: 5:00; End Time: 6:00pm     4. Location of contact:  Originating Location (patient location): homer, located in [place], [place]  Distant Location (provider location): Home office, located in [place], [place], using appropriate privacy considerations and procedures    5. Did the client complete the home practice option(s) from the previous session: Completed    6. Motivational Teaching Strategies:  Connect info and skills with personal goals  Promote hope and positive expectations  Explore pros and cons of change  Re-frame experiences in positive light    7. Educational Teaching Strategies:  Review of written material/education  Relate information to client's experience  Ask questions to check comprehension  Break down information into small chunks  Adopt client's language     8. CBT Teaching Strategies:  Reinforcement and shaping (positive feedback for steps towards goals, gains in knowledge & skills and follow-through on home assignments)  Relapse prevention planning (review of stressors and early warning signs)  Behavioral tailoring (communication and problem solving skills)    9. Psychoeducational Topic(s) Addressed:  Just the  "Facts - Psychosis    10. Techniques utilized:   [place] announced at beginning of session  Review of homework  Review of goal  Review of previous meeting  Present new material  Role-play  Problem-solving practice  Help client choose a home practice option  Summarize progress made in current session    11. Assessment/Progress Note:     Continued Just the Facts - Psychosis. Dialogued about potential causes of psychosis and the Stress-Vulnerability Model. Emphasized that no body causes psychosis and causes are most often easily identified in hindsight. Mom identified biological vulnerability factors as genetics combated by continued use of medications, staying away from alcohol and drugs, and physical activity when working. Stressors were identified as school, overwhelming situations, and social situations combated by valuing free time, hobbies, and skills learned in IRT.     Defined first episode psychosis as being in three phases - prodrome, acute and recovery. Family was able to identified [place] as having experienced beginning recovery phase(s); \"moving in a positive direction,\" per mom.     Discussed treatment recommendations to include antipsychotic medication, individual, group, and family therapy, taking steps toward school/employment goals, socialization, abstinence from alcohol and drugs, learning strategies to manage stress and symptoms, exercise and health eating, strong family communication, and ongoing involvement in [facility].     Home practice identified as: review remainder of What is psychosis? module.    Overall family seemed readily engaged in conversation. They did express interest in continuing to meet for family therapy and psychoeducation. As of today's appt their insight into Bob's mental illness appears adequate. They seem they would benefit from continued clinical intervention aimed at assisting them implement helpful strategies at home and increase their understanding of psychosis.     12. " Plan/Referrals:     Will meet with family weekly as schedule allows for evidence based family psychoeducation and therapeutic support aimed at maximizing Bob's opportunity for recovery from psychosis.     MADELYN McintyreATE        Birth Control Pills Pregnancy And Lactation Text: This medication should be avoided if pregnant and for the first 30 days post-partum.

## 2023-06-04 ENCOUNTER — HEALTH MAINTENANCE LETTER (OUTPATIENT)
Age: 23
End: 2023-06-04

## 2024-04-06 NOTE — Clinical Note
----- Message from Kriss Oconnell DO sent at 12/6/2023  3:00 PM EST -----  Thyroid is looking good. Plan for 6 month follow up. Happy holidays.    Ready to be signed, thanks!     No

## 2024-07-14 ENCOUNTER — HEALTH MAINTENANCE LETTER (OUTPATIENT)
Age: 24
End: 2024-07-14

## 2024-10-24 NOTE — PROGRESS NOTES
[facility] Clinician Contact & Progress Note  For Individual Resiliency Training (IRT)  A Part of the [facility] First Episode of Psychosis Program    NAVIGATE Enrollee: Bob Das (2000)     MRN: [id number]  Date:  1/06/21  Diagnosis: Psychosis, unspecified F29.0  Clinician: [facility] Individual Resiliency Trainer, MADELYN Scales     1. Type of contact: (majority of time spent)  IRT Session via telehealth  Mode of communication: Zoom (HIPPA compliant, secure platform). Patient consented verbally to this mode of therapy today.  Reason for telehealth: COVID-19. This patient visit was converted to a telehealth visit to minimize exposure to COVID-19.    2. People present:   Writer  Client: Yes    3. Length of Actual Contact: Start Time: 6pm; End Time: 7:04pm     4. Location of contact:  Originating Location (patient location): home, located in [place], [place] (writer obtained [place] license #[id number])  Distant Location (provider location): Home office, located in [place], [place], using appropriate privacy considerations and procedures    5. Did the client complete the home practice option(s) from the previous session: Partially Completed    6. Motivational Teaching Strategies:  Connect info and skills with personal goals  Promote hope and positive expectations  Explore pros and cons of change  Re-frame experiences in positive light    7. Educational Teaching Strategies:  Review of written material/education  Relate information to client's experience  Ask questions to check comprehension  Break down information into small chunks  Adopt client's language     8. CBT Teaching Strategies:  Reinforcement and shaping (positive feedback for steps towards goals and gains in knowledge & skills)  Relapse prevention planning (review of stressors and early warning signs)  Coping skills training (review current coping skills and increase currently used skills)  Behavioral tailoring (fit taking medication into client's daily  routine)    9. IRT Module(s) Addressed:  Dealing with Negative Feelings    10. Techniques utilized:   [place] announced at beginning of session  Review of goal  Review of previous meeting  Present new material  Problem-solving practice  Help client choose a home practice option  Summarize progress made in current session    11. Measures:    Mental Status Exam  Alertness: alert  and oriented  Behavior/Demeanor: cooperative and pleasant  Speech: regular rate and rhythm  Language: intact and no obvious problem.   Mood: depressed and worried  Thought Process/Associations: unremarkable  Thought Content:  Reports suicidal ideation and preoccupations;  Denies violent ideation and delusions  Perception:  Reports auditory hallucinations without commands [details in Interim History] and visual hallucinations;  Denies depersonalization and derealization  Insight: fair  Judgment: fair  Cognition: does  appear grossly intact; formal cognitive testing was not done    KATHY-7  Over the last 2 weeks, how often have you been bothered by the following problems?    1. Feeling nervous, anxious or on edge: 2 - More than half the days  2. Not being able to stop or control worryin - Not at all  3. Worrying too much about different things: 1 - Several days  4. Trouble relaxin - More than half the days  5. Being so restless that it is hard to sit still: 0 - Not at all  6. Becoming easily annoyed or irritable: 1 - Several days  7. Feeling afraid as if something awful might happen: 1 - Several days    PHQ-9  Over the last 2 weeks, how often have you been bothered by any the following problems?    1. Little interest or pleasure in doing things: 1 - Several days  2. Feeling down, depressed, or hopeless: 2 - More than half the days  3. Trouble falling or staying asleep, or sleeping too much: 0 - Not at all  4. Feeling tired or having little energy: 2 - More than half the days  5. Poor appetite or overeatin - Not at all  6. Feeling bad  "about yourself - or that you are a failure or have let yourself or your family down: 2 - More than half the days  7. Trouble concentrating on things, such as reading the newspaper or watching television: 3 - Nearly every day   8. Moving or speaking so slowly that other people could have noticed. Or the opposite-being fidgety or restless that you have been moving around a lot more than usual: 1 - Several days  9. Thoughts that you would be better off dead, or of hurting yourself in some way: 1 - Several days \"a 1.5\"    If you checked off any problems, how difficulty have these problems made it for you to do your work, take care of things at home, or get along with other people? Somewhat difficult    [place] Protocol Risk Identification  1) Have you wished you were dead or wished you could go to sleep and not wake up? No  2) Have you actually had any thoughts about killing yourself? Yes  If YES to 2, answer questions 3, 4, 5, 6  If NO to 2, go directly to question 6  3) Have you thought about how you might do this? No  4) Have you had any intension of acting on these thoughts of killing yourself, as opposed to you have the thoughts but you definitely would not act on them? No  5) Have you started to work out or worked out the details of how to kill yourself? Do you intend to carry out this plan? No  Always Ask Question 6  6) Have you done anything, started to do anything, or prepared to do anything to end your life? No  Examples: collected pills, obtained a gun, gave away valuables, wrote a will or suicide note, held a gun but changed your mind, cut yourself, tried to hang yourself, etc.    12. Assessment/Progress Note:     Writer met with Bob via telehealth on this date for IRT. Writer set agenda to check-in, discuss and assess symptoms, explore material in IRT modules, discuss ways in which [place] can expand coping strategies and stress-management techniques for ongoing symptom management, and check-in regarding " "goals for ongoing recovery. Bob seemed distracted and/or preoccupied when going over assessment measures as outlined above. He confirmed he was having a difficult time concentrating. 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 He reports feeling that his work has become very overwhelming to him. Reports feelings of embarrassment that he isn't able to get more done and also reports potential perfectionism and toiling over ever aspect of his work in a way that becomes burdensome to him. 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 Began identifying some rigid thought patterns that seem maladaptive and will continue to in upcoming sessions. Assigned HP to include tracking thoughts and inner monologue when he is experiencing stress, frustration and overwhelm related to his art and bring to next session. Overall, Bob was open and engaged throughout the session. Symptom assessment, safety assessment, discussion and identification of coping strategies, and exploration of material in IRT modules was all in support of Bob's self-identified goal(s) as identified in most recent BEH Treatment Plan. Progress toward goal completion seems good.    13. Plan/Referrals:     Scheduled in one week for next virtual IRT. Client and family aware they can reach out to writer directly and/or [facility] team should concerns or needs arise prior to next scheduled appointment.     Billing for \"Interactive Complexity\"?    No      MADELYN Scales    [facility] Individual Resiliency Trainer  " Yes